# Patient Record
Sex: FEMALE | Race: WHITE | Employment: OTHER | URBAN - METROPOLITAN AREA
[De-identification: names, ages, dates, MRNs, and addresses within clinical notes are randomized per-mention and may not be internally consistent; named-entity substitution may affect disease eponyms.]

---

## 2017-10-03 ENCOUNTER — HOSPITAL ENCOUNTER (OUTPATIENT)
Dept: MAMMOGRAPHY | Age: 72
Discharge: HOME | End: 2017-10-03
Attending: INTERNAL MEDICINE | Admitting: INTERNAL MEDICINE

## 2017-10-03 DIAGNOSIS — Z12.31 VISIT FOR SCREENING MAMMOGRAM: ICD-10-CM

## 2018-10-08 ENCOUNTER — HOSPITAL ENCOUNTER (OUTPATIENT)
Dept: MAMMOGRAPHY | Age: 73
Discharge: HOME | End: 2018-10-08
Payer: MEDICARE

## 2018-10-08 DIAGNOSIS — Z12.31 VISIT FOR SCREENING MAMMOGRAM: ICD-10-CM

## 2018-10-08 PROCEDURE — 77063 BREAST TOMOSYNTHESIS BI: CPT

## 2019-10-14 ENCOUNTER — HOSPITAL ENCOUNTER (OUTPATIENT)
Dept: MAMMOGRAPHY | Age: 74
Discharge: HOME | End: 2019-10-14
Payer: MEDICARE

## 2019-10-14 DIAGNOSIS — Z12.31 VISIT FOR SCREENING MAMMOGRAM: ICD-10-CM

## 2019-10-14 PROCEDURE — 77063 BREAST TOMOSYNTHESIS BI: CPT

## 2019-10-24 LAB — ANTIBODY: NORMAL

## 2020-10-22 LAB
AVERAGE GLUCOSE: 117
HBA1C MFR BLD: 5.7 %

## 2020-10-23 ENCOUNTER — HOSPITAL ENCOUNTER (OUTPATIENT)
Dept: MAMMOGRAPHY | Age: 75
Discharge: HOME | End: 2020-10-23
Payer: MEDICARE

## 2020-10-23 PROCEDURE — 77063 BREAST TOMOSYNTHESIS BI: CPT

## 2021-01-11 ENCOUNTER — OFFICE VISIT (OUTPATIENT)
Dept: ORTHOPEDIC SURGERY | Age: 76
End: 2021-01-11
Payer: MEDICARE

## 2021-01-11 VITALS — BODY MASS INDEX: 24.11 KG/M2 | TEMPERATURE: 97.3 F | WEIGHT: 150 LBS | HEIGHT: 66 IN

## 2021-01-11 DIAGNOSIS — M25.511 RIGHT SHOULDER PAIN, UNSPECIFIED CHRONICITY: ICD-10-CM

## 2021-01-11 DIAGNOSIS — M67.911 TENDINOPATHY OF RIGHT ROTATOR CUFF: Primary | ICD-10-CM

## 2021-01-11 PROCEDURE — 99203 OFFICE O/P NEW LOW 30 MIN: CPT | Performed by: ORTHOPAEDIC SURGERY

## 2021-01-11 SDOH — HEALTH STABILITY: MENTAL HEALTH: HOW OFTEN DO YOU HAVE A DRINK CONTAINING ALCOHOL?: NEVER

## 2021-01-11 SDOH — HEALTH STABILITY: MENTAL HEALTH: HOW MANY STANDARD DRINKS CONTAINING ALCOHOL DO YOU HAVE ON A TYPICAL DAY?: NOT ASKED

## 2021-01-11 NOTE — PROGRESS NOTES
CHIEF COMPLAINT: Right shoulder pain    History:    More Bailey is a 77 y.o. right handed female self-referred for evaluation and treatment of Right shoulder pain. This is evaluated as a personal injury. The pain is described as aching. The pain began 1 year ago. There was not an injury. Pain is rated as a 4/10. Pain is located lateral. The symptoms are worse with reaching, lifting, work at or above shoulder height, laying on side. Symptoms improve with heat and Advil. No stiffness, no weakness reported. The patient has not had PT. The patient has not had an injection. The patient has tried NSAIDs. The patient has not tried ice. Patient's occupation is retired. Outside reports reviewed:  none. History reviewed. No pertinent past medical history. History reviewed. No pertinent surgical history. No current outpatient medications on file prior to visit. No current facility-administered medications on file prior to visit.         No Known Allergies    Social History     Socioeconomic History    Marital status: Single     Spouse name: Not on file    Number of children: Not on file    Years of education: Not on file    Highest education level: Not on file   Occupational History    Not on file   Social Needs    Financial resource strain: Not on file    Food insecurity     Worry: Not on file     Inability: Not on file    Transportation needs     Medical: Not on file     Non-medical: Not on file   Tobacco Use    Smoking status: Never Smoker    Smokeless tobacco: Never Used   Substance and Sexual Activity    Alcohol use: Never     Frequency: Never     Binge frequency: Never    Drug use: Not on file    Sexual activity: Not on file   Lifestyle    Physical activity     Days per week: Not on file     Minutes per session: Not on file    Stress: Not on file   Relationships    Social connections     Talks on phone: Not on file     Gets together: Not on file     Attends Faith service: Not on file     Active member of club or organization: Not on file     Attends meetings of clubs or organizations: Not on file     Relationship status: Not on file    Intimate partner violence     Fear of current or ex partner: Not on file     Emotionally abused: Not on file     Physically abused: Not on file     Forced sexual activity: Not on file   Other Topics Concern    Not on file   Social History Narrative    Not on file       History reviewed. No pertinent family history. Review of Systems:   I have reviewed the clinically relevant past medical history, medications, allergies, family history, social history, and 13 point Review of Systems from the patient's recent history form & documented any details relevant to today's presenting complaints in the history above. The patient's self-reported past medical history, medications, allergies, family history, social history, and Review of Systems form from 1/11/21 have been scanned into the chart under the \"Media\" tab. Physical Examination:      Vital signs:  Temp 97.3 °F (36.3 °C)   Ht 5' 6\" (1.676 m)   Wt 150 lb (68 kg)   BMI 24.21 kg/m²     General:   alert, appears stated age, cooperative and no distress   Right Shoulder   Active ROM:   forward flexion 170, external rotation 70, internal rotation L4. Bilateral shoulders   Passive ROM:  forward flexion 180    Joint Tenderness:   lateral acromial   Neer:   positive   Corona:   positive   Strength:   5/5 Supraspinatus, External rotation    Bilateral shoulders   Drop-arm test:   negative   Belly-press test:   negative   Bear-hug test:   negative   Speed's test:   negative   Bicipital groove tenderness:  negative   Jaimes's test:   not tested   Cross-body adduction test:   negative    AC joint tenderness:   negative     There are no skin lesions, cellulitis, or extreme edema in the upper extremities. Sensation is grossly intact to light touch bilaterally upper extremity.  The patient has warm and well-perfused Bilateral upper extremities with brisk capillary refill. Imaging   Right Shoulder X-Ray: 3 view x-rays of the shoulder including AP, scapular Y, and axillary    obtained and reviewed  AC Joint: narrowing moderate  Glenohumeral joint: no abnormalities noted  Elevation humeral head: absent        Assessment:      Right shoulder rotator cuff tendinopathy      Plan:      Natural history and expected course discussed. Questions answered. Ice prn. Offered injection. She deferred. NSAIDs prn. PT referral.     Follow up in 2 months.

## 2021-01-19 ENCOUNTER — HOSPITAL ENCOUNTER (OUTPATIENT)
Dept: PHYSICAL THERAPY | Age: 76
Setting detail: THERAPIES SERIES
Discharge: HOME | End: 2021-01-19
Payer: MEDICARE

## 2021-01-19 PROCEDURE — 97110 THERAPEUTIC EXERCISES: CPT

## 2021-01-19 PROCEDURE — 97161 PT EVAL LOW COMPLEX 20 MIN: CPT

## 2021-01-19 PROCEDURE — 97140 MANUAL THERAPY 1/> REGIONS: CPT

## 2021-01-19 NOTE — FLOWSHEET NOTE
[facility] [place].  [address]  Phone: [phone]   Fax:     [phone]        Physical Therapy Treatment Note/ Progress Report:       Date:  2021    Patient Name:  Temitope Talamantes   \"Nicole\"    :  1945  MRN: [id number]    Pertinent Medical History:     Medical/Treatment Diagnosis Information:  Diagnosis: Tendinopathy of right rotator cuff (M67.911  Treatment Diagnosis: Right shoulder dysfunction due to rotator cuff functional impingement/ tendinitis    Insurance/Certification information:  PT Insurance Information: Dicie Meckel PPO  Physician Information:  Referring Practitioner: Dr Sha Fernandez of care signed (Y/N): routed 21    Date of Patient follow up with Physician: 3/15/21     Progress Report: []  Yes  [x]  No     Date Range for reporting period:  Beginnin2021  Ending:      Progress report due (10 Rx/or 30 days whichever is less): 56    Recertification due (POC duration/ or 90 days whichever is less):      Visit # POC/Insurance Allowable Auth Needed   1 BOMN []Yes    [x]No     Functional Outcomes Measure:   Date Assessed:  Test:  Score:     Pain level:0-7 /10     History of Injury:     SUBJECTIVE:  See eval    OBJECTIVE:    Observation:    Test measurements:      RESTRICTIONS/PRECAUTIONS: none    Exercises/Interventions:   Therapeutic Ex (73936)  Min: Resistance/Reps Cues/Notes   T slide  Rows  Ext  Add  IR  ER     Door stretch                Mat Ex     SL'ing ranger with stretch to scap muscles      SL'ing ER     Chin tucks           Manual Intervention  ([id number] [facility])  Min:     IASTM     shld mobs     stretching               NMR re-education ()  Min:               Therapeutic Activity (06373)  Min:               Modalities:  Min     Pulsed US                      Other Therapeutic Activities:  Pt was educated on PT POC, Diagnosis, Prognosis, pathomechanics as well as frequency and duration of scheduling future physical therapy appointments. Time was also taken on this day to answer all patient questions and participation in PT. Reviewed appointment policy in detail with patient and patient verbalized understanding. Home Exercise Program:  HEP instruction: Access Code: [email or web address]   URL: [email or web address]   Date: 01/19/2021   Prepared by: Radha Mcgraw     Exercises   · Seated Scapular Retraction - 10 reps - 1-2 sets - 5 hold - 2x daily - 7x weekly   · Isometric Shoulder Abduction at Wall - 15 reps - 1-2 sets - 5 hold - 2x daily - 7x weekly   · Standing Isometric Shoulder Internal Rotation at Doorway - 15 reps - 1-2 sets - 5 hold - 2x daily - 7x weekly   · Standing Isometric Shoulder External Rotation with Doorway and Towel Roll - 15 reps - 1-2 sets - 5 hold - 2x daily - 7x weekly   The patient demonstrated good tolerance to and understanding of the HEP. Written instructions have been issued.       Therapeutic Exercise and NMR EXR  [] (40878) Provided verbal/tactile cueing for activities related to strengthening, flexibility, endurance, ROM  for improvements in scapular, scapulothoracic and UE control with self care, reaching, carrying, lifting, house/yardwork, driving/computer work.    [] (76796) Provided verbal/tactile cueing for activities related to improving balance, coordination, kinesthetic sense, posture, motor skill, proprioception  to assist with  scapular, scapulothoracic and UE control with self care, reaching, carrying, lifting, house/yardwork, driving/computer work. Therapeutic Activities:    [] (54377 or 25309) Provided verbal/tactile cueing for activities related to improving balance, coordination, kinesthetic sense, posture, motor skill, proprioception and motor activation to allow for proper function of scapular, scapulothoracic and UE control with self care, carrying, lifting, driving/computer work.      Home Exercise Program:    [x] (40638) Reviewed/Progressed HEP activities related to strengthening, flexibility, endurance, ROM of scapular, scapulothoracic and UE control with self care, reaching, carrying, lifting, house/yardwork, driving/computer work  [] (62854) Reviewed/Progressed HEP activities related to improving balance, coordination, kinesthetic sense, posture, motor skill, proprioception of scapular, scapulothoracic and UE control with self care, reaching, carrying, lifting, house/yardwork, driving/computer work      Manual Treatments:  PROM / STM / Oscillations-Mobs:  G-I, II, III, IV (PA's, Inf., Post.)  [] (20231) Provided manual therapy to mobilize soft tissue/joints of cervical/CT, scapular GHJ and UE for the purpose of modulating pain, promoting relaxation,  increasing ROM, reducing/eliminating soft tissue swelling/inflammation/restriction, improving soft tissue extensibility and allowing for proper ROM for normal function with self care, reaching, carrying, lifting, house/yardwork, driving/computer work    Charges:  Timed Code Treatment Minutes: 25   Total Treatment Minutes: 45       [x] EVAL (LOW) 34567 (typically 20 minutes face-to-face)  [] EVAL (MOD) 46989 (typically 30 minutes face-to-face)  [] EVAL (HIGH) 87615 (typically 45 minutes face-to-face)  [] RE-EVAL     [x] KF(45228) x     [] Dry needle 1 or 2 Muscles (19926)  [] NMR (38418) x     [] Dry needle 3+ Muscles (19535)  [x] Manual (06036) x     [] Ultrasound (78515) x  [] TA (66861) x     [] Mech Traction (94756)  [] ES(attended) (22101)     [] ES (un) (03526):   [] Vasopump (76347) [] Ionto (94120)   [] Other:      GOALS:  Patient stated goal:\"to be rid of stiffness and weakness\"  []? Progressing: []? Met: []? Not Met: []? Adjusted     Therapist goals for Patient:   Short Term Goals: To be achieved in: 2 weeks  1. Independent in HEP and progression per patient tolerance, in order to prevent re-injury. []? Progressing: []? Met: []?  Not Met: []? Adjusted  2. Patient will have a decrease in pain 0-4/10 to facilitate improvement in movement, function, and ADLs as indicated by Functional Deficits. []? Progressing: []? Met: []? Not Met: []? Adjusted     Long Term Goals: To be achieved in: 6 weeks  1. Disability index score of 16 or less for the Quick DASH to assist with reaching prior level of function. []? Progressing: []? Met: []? Not Met: []? Adjusted  2. Patient will demonstrate increased AROM to 10 each flexion and abduction  to allow for proper joint functioning as indicated by Functional Deficits. []? Progressing: []? Met: []? Not Met: []? Adjusted  3. Patient will return to   activities without increased symptoms or restriction. []? Progressing: []? Met: []? Not Met: []? Adjusted  4 . Patient will have a decrease in pain 0-4/10 to facilitate improvement in movement, function, and ADLs as indicated by Functional Deficits. []? Progressing: []? Met: []? Not Met: []? Adjusted    ASSESSMENT:  See eval    Treatment/Activity Tolerance:  [x] Patient tolerated treatment well [] Patient limited by fatique  [] Patient limited by pain  [] Patient limited by other medical complications  [] Other:     Overall Progression Towards Functional goals/ Treatment Progress Update:  [] Patient is progressing as expected towards functional goals listed. [] Progression is slowed due to complexities/Impairments listed. [] Progression has been slowed due to co-morbidities. [x] Plan just implemented, too soon to assess goals progression <30days   [] Goals require adjustment due to lack of progress  [] Patient is not progressing as expected and requires additional follow up with physician  [] Other    Prognosis for POC: [x] Good [] Fair  [] Poor    Patient requires continued skilled intervention: [x] Yes  [] No      PLAN: Postural ed.  And training, decrease forward head posture, improve shoulder postural alignment, decrease inflammation and improve RC strength   []

## 2021-01-19 NOTE — PROGRESS NOTES
[facility]. [address]  Phone: [phone]   Fax:     [phone]                                                       Physical Therapy Certification    Dear Referring Practitioner: Dr Yolanda Figueroa,    We had the pleasure of evaluating the following patient for physical therapy services at [facility]. A summary of our findings can be found in the initial assessment below. This includes our plan of care. If you have any questions or concerns regarding these findings, please do not hesitate to contact me at the office phone number checked above.   Thank you for the referral.       Physician Signature:_______________________________Date:__________________  By signing above (or electronic signature), therapists plan is approved by physician              Patient: Jose Enrique Kwan   : 1945   MRN: [id number]  Referring Physician: Referring Practitioner: Dr Yolanda Figueroa      Evaluation Date: 2021      Medical Diagnosis Information:  Diagnosis: Tendinopathy of right rotator cuff (M67.911   Treatment Diagnosis: Right shoulder dysfunction due to rotator cuff functional impingement/ tendinitis                                         Insurance information: PT Insurance Information: Diane Jett PPO    Precautions/ Contra-indications:  Latex Allergy:   [x]  NO      []YES  Preferred Language for Healthcare:   [x]English       []other:    C-SSRS Triggered by Intake questionnaire (Past 2 wk assessment ):   [x] No, Questionnaire did not trigger screening.   [] Yes, Patient intake triggered C-SSRS Screening      [] C-SSRS Screening completed  [] PCP notified via Epic     SUBJECTIVE: c/o right shoulder pain slowly worsening over the last year or so, she is right side dominant , has not had neck pain in 3 weeks     Relevant Medical History:   Functional Outcomes Measure: Quick dash = 32    Pain Scale: 0-7/10 averages 4/10  Easing factors: sitting/ reading  Provocative factors: dressing/ undressing, reaching up to get things from a shelf and lying on right side, typically better in the mornings and worse as the day goes on    Type: []Constant   [x]Intermittent  []Radiating []Localized []other:     Numbness/Tingling: none    Occupation/School:retired     Living Status/Prior Level of Function: Independent with ADLs and IADLs    OBJECTIVE:   Posture: forward head, increased thoracic kyphosis, protracted shouolders     Functional Mobility/Transfers: independent     Palpation:ttp Right  teres muscles, pectoralis and acj    Bandages/Dressings/Incisions: NA    Gait: (include devices/WB status): WNL     CERV ROM     Cervical Flexion wnl's    Cervical Extension Mod decrease    Cervical SB Mod decrease     Cervical rotation 0-35 L/R ea         ROM Left Right   Shoulder Flex 0-135 0-125   Shoulder Abd 0-125 0-115   Shoulder ER  0-85   Shoulder IR  0-55             Strength  Left Right   Shoulder Flex  4+/5   Shoulder Scap  4+   Shoulder ER  4+   Shoulder IR  5           Reflexes Normal Abnormal Comments   [x]ALL NORMAL            S1-2 Seated achilles [] []    S1-2 Prone knee bend [] []    L3-4 Patellar tendon [] []    C5-6 Biceps [] []    C6 Brachioradialis [] []    C7-8 Triceps [] []    Clonus [] []    Babinski [] []    Ansari's [] []      Reflexes/Sensation:    [x]Dermatomes/Myotomes intact    [x]Reflexes equal and normal bilaterally   []Other:    Joint mobility:r SCAP. THOR. AND GHJ    []Normal    [x]Hypo   []Hyper    Orthopedic Special Tests: R shld. Tests positive for Neer's, Hawkin's Benjy and O'Briens                        [x] Patient history, allergies, meds reviewed. Medical chart reviewed. See intake form. Review Of Systems (ROS):  [x]Performed Review of systems (Integumentary, CardioPulmonary, Neurological) by intake and observation.  Intake form has been scanned into medical record. Patient has been instructed to contact their primary care physician regarding ROS issues if not already being addressed at this time. Co-morbidities/Complexities (which will affect course of rehabilitation):   []None           Arthritic conditions   []Rheumatoid arthritis (M05.9)  [x]Osteoarthritis (M19.91)   Cardiovascular conditions   []Hypertension (I10)  []Hyperlipidemia (E78.5)  []Angina pectoris (I20)  []Atherosclerosis (I70)  []CVA Musculoskeletal conditions   []Disc pathology   []Congenital spine pathologies   []Prior surgical intervention  []Osteoporosis (M81.8)  []Osteopenia (M85.8)   Endocrine conditions   []Hypothyroid (E03.9)  []Hyperthyroid Gastrointestinal conditions   []Constipation (J58.45)   Metabolic conditions   []Morbid obesity (E66.01)  []Diabetes type 1(E10.65) or 2 (E11.65)   []Neuropathy (G60.9)     Pulmonary conditions   []Asthma (J45)  []Coughing   []COPD (J44.9)   Psychological Disorders  []Anxiety (F41.9)  []Depression (F32.9)   []Other:   []Other:          Barriers to/and or personal factors that will affect rehab potential:              [x]Age  []Sex   []Smoker              []Motivation/Lack of Motivation                        []Co-Morbidities              []Cognitive Function, education/learning barriers              []Environmental, home barriers              []profession/work barriers  []past PT/medical experience  []other:  Justification:     Falls Risk Assessment (30 days):   [x] Falls Risk assessed and no intervention required.   [] Falls Risk assessed and Patient requires intervention due to being higher risk   TUG score (>12s at risk):     [] Falls education provided, including         ASSESSMENT:    Functional Impairments:     [x]Noted spinal or UE joint hypomobility   []Noted spinal or UE joint hypermobility   [x]Decreased spinal/UE functional ROM   []Abnormal reflexes/sensation/myotomal/dermatomal deficits   []Decreased RC/scapular/core advanced manual therapy techniques: (Elevated CV risk profile, recent trauma, intolerance to end range positions, prior TIA, visual issues, UE neurological compromise )     Prognosis/Rehab Potential:      []Excellent   [x]Good    [x]Fair   []Poor    Tolerance of evaluation/treatment:    []Excellent   [x]Good    []Fair   []Poor    Physical Therapy Evaluation Complexity Justification  [x] A history of present problem with:  [] no personal factors and/or comorbidities that impact the plan of care;  [x]1-2 personal factors and/or comorbidities that impact the plan of care  []3 personal factors and/or comorbidities that impact the plan of care  [x] An examination of body systems using standardized tests and measures addressing any of the following: body structures and functions (impairments), activity limitations, and/or participation restrictions;:  [x] a total of 1-2 or more elements   [] a total of 3 or more elements   [] a total of 4 or more elements   [x] A clinical presentation with:  [x] stable and/or uncomplicated characteristics   [] evolving clinical presentation with changing characteristics  [] unstable and unpredictable characteristics;   [x] Clinical decision making of [] low, [] moderate, [] high complexity using standardized patient assessment instrument and/or measurable assessment of functional outcome. [x] EVAL (LOW) 00624 (typically 20 minutes face-to-face)  [] EVAL (MOD) 39769 (typically 30 minutes face-to-face)  [] EVAL (HIGH) 63310 (typically 45 minutes face-to-face)  [] RE-EVAL     PLAN:   Frequency/Duration:  2 days per week for 4-6Weeks:  Interventions:  [x]  Therapeutic exercise including: strength training, ROM, for scapula, core and Upper extremity, including postural re-education. [x]  NMR activation and proprioception for UE, periscapular and RC muscles and Core, including postural re-education.     [x]  Manual therapy as indicated for shoulder, scapula, spine and associated soft tissue including: Dry Needling/IASTM, STM, PROM, Gr I-IV mobilizations, manipulation. [x] Modalities as needed that may include: thermal agents, E-stim, Biofeedback, US, iontophoresis as indicated  [x] Patient education on joint protection, postural re-education, activity modification, progression of HEP. HEP instruction: Access Code: [email or web address]   URL: [email or web address]   Date: 01/19/2021   Prepared by: Magda Espinoza     Exercises   Seated Scapular Retraction - 10 reps - 1-2 sets - 5 hold - 2x daily - 7x weekly   Isometric Shoulder Abduction at Wall - 15 reps - 1-2 sets - 5 hold - 2x daily - 7x weekly   Standing Isometric Shoulder Internal Rotation at Doorway - 15 reps - 1-2 sets - 5 hold - 2x daily - 7x weekly   Standing Isometric Shoulder External Rotation with Doorway and Towel Roll - 15 reps - 1-2 sets - 5 hold - 2x daily - 7x weekly   The patient demonstrated good tolerance to and understanding of the HEP. Written instructions have been issued. GOALS:  Patient stated goal:\"to be rid of stiffness and weakness\"  [] Progressing: [] Met: [] Not Met: [] Adjusted    Therapist goals for Patient:   Short Term Goals: To be achieved in: 2 weeks  1. Independent in HEP and progression per patient tolerance, in order to prevent re-injury. [] Progressing: [] Met: [] Not Met: [] Adjusted  2. Patient will have a decrease in pain 0-4/10 to facilitate improvement in movement, function, and ADLs as indicated by Functional Deficits. [] Progressing: [] Met: [] Not Met: [] Adjusted    Long Term Goals: To be achieved in: 6 weeks  1. Disability index score of 16 or less for the Quick DASH to assist with reaching prior level of function. [] Progressing: [] Met: [] Not Met: [] Adjusted  2. Patient will demonstrate increased AROM to 10 each flexion and abduction  to allow for proper joint functioning as indicated by Functional Deficits. [] Progressing: [] Met: [] Not Met: [] Adjusted  3.  Patient will return to activities without increased symptoms or restriction. [] Progressing: [] Met: [] Not Met: [] Adjusted  4 . Patient will have a decrease in pain 0-4/10 to facilitate improvement in movement, function, and ADLs as indicated by Functional Deficits. [] Progressing: [] Met: [] Not Met: [] Adjusted    Electronically signed by:  Gianni Spangler PT      Note: If patient does not return for scheduled/recommended follow up visits, this note will serve as a discharge from care along with the most recent update on progress.

## 2021-01-22 ENCOUNTER — HOSPITAL ENCOUNTER (OUTPATIENT)
Dept: PHYSICAL THERAPY | Age: 76
Setting detail: THERAPIES SERIES
Discharge: HOME | End: 2021-01-22
Payer: MEDICARE

## 2021-01-22 PROCEDURE — 97035 APP MDLTY 1+ULTRASOUND EA 15: CPT

## 2021-01-22 PROCEDURE — 97110 THERAPEUTIC EXERCISES: CPT

## 2021-01-22 PROCEDURE — 97140 MANUAL THERAPY 1/> REGIONS: CPT

## 2021-01-22 NOTE — FLOWSHEET NOTE
[facility] [place].  [address]  Phone: [phone]   Fax:     [phone]        Physical Therapy Treatment Note/ Progress Report:       Date:  2021    Patient Name:  Guadalupe Lesch   \"Nicole\"    :  1945  MRN: [id number]    Pertinent Medical History:     Medical/Treatment Diagnosis Information:  Diagnosis: Tendinopathy of right rotator cuff (M67.911  Treatment Diagnosis: Right shoulder dysfunction due to rotator cuff functional impingement/ tendinitis    Insurance/Certification information:  PT Insurance Information: Eliu Altamirano PPO  Physician Information:  Referring Practitioner: Dr Jodie Dixon of care signed (Y/N): routed 21    Date of Patient follow up with Physician: 3/15/21     Progress Report: []  Yes  [x]  No     Date Range for reporting period:  Beginnin2021  Ending:      Progress report due (10 Rx/or 30 days whichever is less): 05    Recertification due (POC duration/ or 90 days whichever is less):      Visit # POC/Insurance Allowable Auth Needed   2 BOMN []Yes    [x]No     Functional Outcomes Measure:   Date Assessed:  Test:  Score:     Pain level:0-7 /10     History of Injury:     SUBJECTIVE:    21 Pt reports no pain as long as she is at rest.     OBJECTIVE:    Observation:    Test measurements:      RESTRICTIONS/PRECAUTIONS: none    Exercises/Interventions:   Therapeutic Ex (80906)  Min: Resistance/Reps Cues/Notes   T slide  Rows  Ext  Add  IR  ER   Green 3\" 30 x   Green 3\" 30 x     Door stretch                Mat Ex     SL'ing ranger with stretch to scap muscles      SL'ing ER     Chin tucks           Manual Intervention  ([phone])  Min:     IASTM R supraspinatus    shld mobs     stretching     DTM R supraspinatus and teres muscles           NMR re-education (41460)  Min:               Therapeutic Activity (07996)  Min: Modalities:  Min     Pulsed US 50% R GHJ x 8 min                     Other Therapeutic Activities:  Pt was educated on PT POC, Diagnosis, Prognosis, pathomechanics as well as frequency and duration of scheduling future physical therapy appointments. Time was also taken on this day to answer all patient questions and participation in PT. Reviewed appointment policy in detail with patient and patient verbalized understanding. Home Exercise Program:  HEP instruction: Access Code: [email or web address]   URL: [employer]/   Date: 01/19/2021   Prepared by: Radha Mcgraw     Exercises   · Seated Scapular Retraction - 10 reps - 1-2 sets - 5 hold - 2x daily - 7x weekly   · Isometric Shoulder Abduction at Wall - 15 reps - 1-2 sets - 5 hold - 2x daily - 7x weekly   · Standing Isometric Shoulder Internal Rotation at Doorway - 15 reps - 1-2 sets - 5 hold - 2x daily - 7x weekly   · Standing Isometric Shoulder External Rotation with Doorway and Towel Roll - 15 reps - 1-2 sets - 5 hold - 2x daily - 7x weekly   1/22/21 Reviewed HEP from 1/19 Corrected tech. With posture and position of exercise. Tech. Otherwise was good. The patient demonstrated good tolerance to and understanding of the HEP. Written instructions have been issued.       Therapeutic Exercise and NMR EXR  [] (73438) Provided verbal/tactile cueing for activities related to strengthening, flexibility, endurance, ROM  for improvements in scapular, scapulothoracic and UE control with self care, reaching, carrying, lifting, house/yardwork, driving/computer work.    [] (20811) Provided verbal/tactile cueing for activities related to improving balance, coordination, kinesthetic sense, posture, motor skill, proprioception  to assist with  scapular, scapulothoracic and UE control with self care, reaching, carrying, lifting, house/yardwork, driving/computer work.     Therapeutic Activities:    [] (65267 or ) Provided verbal/tactile cueing for activities related to improving balance, coordination, kinesthetic sense, posture, motor skill, proprioception and motor activation to allow for proper function of scapular, scapulothoracic and UE control with self care, carrying, lifting, driving/computer work. Home Exercise Program:    [x] (71337) Reviewed/Progressed HEP activities related to strengthening, flexibility, endurance, ROM of scapular, scapulothoracic and UE control with self care, reaching, carrying, lifting, house/yardwork, driving/computer work  [] (25409) Reviewed/Progressed HEP activities related to improving balance, coordination, kinesthetic sense, posture, motor skill, proprioception of scapular, scapulothoracic and UE control with self care, reaching, carrying, lifting, house/yardwork, driving/computer work      Manual Treatments:  PROM / STM / Oscillations-Mobs:  G-I, II, III, IV (PA's, Inf., Post.)  [] (66305) Provided manual therapy to mobilize soft tissue/joints of cervical/CT, scapular GHJ and UE for the purpose of modulating pain, promoting relaxation,  increasing ROM, reducing/eliminating soft tissue swelling/inflammation/restriction, improving soft tissue extensibility and allowing for proper ROM for normal function with self care, reaching, carrying, lifting, house/yardwork, driving/computer work    Charges:  Timed Code Treatment Minutes: 25   Total Treatment Minutes: 45       [x] EVAL (LOW) 96087 (typically 20 minutes face-to-face)  [] EVAL (MOD) 79571 (typically 30 minutes face-to-face)  [] EVAL (HIGH) 44530 (typically 45 minutes face-to-face)  [] RE-EVAL     [x] UH(58328) x     [] Dry needle 1 or 2 Muscles (27098)  [] NMR (92924) x     [] Dry needle 3+ Muscles (61828)  [x] Manual (88532) x     [] Ultrasound (95875) x  [] TA (10574) x     [] Mech Traction (34726)  [] ES(attended) (23839)     [] ES (un) (66726):   [] Vasopump (46534) [] Ionto (41508)   [] Other:      GOALS:  Patient stated goal:\"to be rid of stiffness and weakness\"  []? Progressing: []? Met: []? Not Met: []? Adjusted     Therapist goals for Patient:   Short Term Goals: To be achieved in: 2 weeks  1. Independent in HEP and progression per patient tolerance, in order to prevent re-injury. []? Progressing: []? Met: []? Not Met: []? Adjusted  2. Patient will have a decrease in pain 0-4/10 to facilitate improvement in movement, function, and ADLs as indicated by Functional Deficits. []? Progressing: []? Met: []? Not Met: []? Adjusted     Long Term Goals: To be achieved in: 6 weeks  1. Disability index score of 16 or less for the Quick DASH to assist with reaching prior level of function. []? Progressing: []? Met: []? Not Met: []? Adjusted  2. Patient will demonstrate increased AROM to 10 each flexion and abduction  to allow for proper joint functioning as indicated by Functional Deficits. []? Progressing: []? Met: []? Not Met: []? Adjusted  3. Patient will return to   activities without increased symptoms or restriction. []? Progressing: []? Met: []? Not Met: []? Adjusted  4 . Patient will have a decrease in pain 0-4/10 to facilitate improvement in movement, function, and ADLs as indicated by Functional Deficits. []? Progressing: []? Met: []? Not Met: []? Adjusted    ASSESSMENT:  See eval    Treatment/Activity Tolerance:  [x] Patient tolerated treatment well [] Patient limited by fatique  [] Patient limited by pain  [] Patient limited by other medical complications  [] Other:     Overall Progression Towards Functional goals/ Treatment Progress Update:  [] Patient is progressing as expected towards functional goals listed. [] Progression is slowed due to complexities/Impairments listed. [] Progression has been slowed due to co-morbidities.   [x] Plan just implemented, too soon to assess goals progression <30days   [] Goals require adjustment due to lack of progress  [] Patient is not progressing as expected and requires additional follow up with physician  [] Other    Prognosis for POC: [x] Good [] Fair  [] Poor    Patient requires continued skilled intervention: [x] Yes  [] No      PLAN:           ADD T slide IR/ ER consider open can   Postural ed. And training, decrease forward head posture, improve shoulder postural alignment, decrease inflammation and improve RC strength   [] Continue per plan of care [] Alter current plan (see comments)  [x] Plan of care initiated [] Hold pending MD visit [] Discharge    Electronically signed by: Jimy Garner PT     Note: If patient does not return for scheduled/recommended follow up visits, this note will serve as a discharge from care along with the most recent update on progress.

## 2021-01-25 ENCOUNTER — HOSPITAL ENCOUNTER (OUTPATIENT)
Dept: PHYSICAL THERAPY | Age: 76
Setting detail: THERAPIES SERIES
Discharge: HOME | End: 2021-01-25
Payer: MEDICARE

## 2021-01-25 PROCEDURE — 97140 MANUAL THERAPY 1/> REGIONS: CPT

## 2021-01-25 PROCEDURE — 97110 THERAPEUTIC EXERCISES: CPT

## 2021-01-25 NOTE — FLOWSHEET NOTE
[facility] [place]. [address]  Phone: [phone]   Fax:     [phone]        Physical Therapy Treatment Note/ Progress Report:       Date:  2021    Patient Name:  Jose Enrique Kwan   \"Nicole\"    :  1945  MRN: [id number]    Pertinent Medical History:     Medical/Treatment Diagnosis Information:  Diagnosis: Tendinopathy of right rotator cuff (M67.911  Treatment Diagnosis: Right shoulder dysfunction due to rotator cuff functional impingement/ tendinitis    Insurance/Certification information:  PT Insurance Information: Diane Jett PPO  Physician Information:  Referring Practitioner: Dr Pavel Mcgee of care signed (Y/N): routed 21    Date of Patient follow up with Physician: 3/15/21     Progress Report: []  Yes  [x]  No     Date Range for reporting period:  Beginnin2021  Ending:      Progress report due (10 Rx/or 30 days whichever is less):     Recertification due (POC duration/ or 90 days whichever is less):      Visit # POC/Insurance Allowable Auth Needed   3 BOMN []Yes    [x]No     Functional Outcomes Measure:   Date Assessed:  Test:  Score:     Pain level:1-2 /10     History of Injury:     SUBJECTIVE:    21 Pt reports no pain as long as she is at rest.   21 Pt reports little to no pain at rest but has pain with \"just lifting arm up\". OBJECTIVE:    Observation:    Test measurements:      RESTRICTIONS/PRECAUTIONS: none    Exercises/Interventions:   Therapeutic Ex (74622)  Min: Resistance/Reps Cues/Notes   T slide  Rows  Ext  Add  IR  ER   Green 3\" 30 x   Green 3\" 30 x     Green 20 x2  Red 20 x 2    Corner  stretch  3 way 12 breath cycles each  Suzette.  Well while preforming stretch , discomfort reported when lowering shoulders              Mat Ex     SL'ing ranger with stretch to scap muscles      SL'ing ER     Chin tucks           Manual Intervention  (19282)  Min: 25 min    IASTM R supraspinatus, infraspinatus , teres muscles, pectoralis muscles     shld mobs ADD    stretching ADD    DTM R  Supraspinatus, infraspinatus  and teres muscles          NMR re-education (87339)  Min:               Therapeutic Activity (08050)  Min:               Modalities:  Min     Pulsed US                      Other Therapeutic Activities:  Pt was educated on PT POC, Diagnosis, Prognosis, pathomechanics as well as frequency and duration of scheduling future physical therapy appointments. Time was also taken on this day to answer all patient questions and participation in PT. Reviewed appointment policy in detail with patient and patient verbalized understanding. Home Exercise Program:  HEP instruction: Access Code: [email or web address]   URL: [email or web address]   Date: 01/19/2021   Prepared by: Efren Cons     Exercises   · Seated Scapular Retraction - 10 reps - 1-2 sets - 5 hold - 2x daily - 7x weekly   · Isometric Shoulder Abduction at Wall - 15 reps - 1-2 sets - 5 hold - 2x daily - 7x weekly   · Standing Isometric Shoulder Internal Rotation at Doorway - 15 reps - 1-2 sets - 5 hold - 2x daily - 7x weekly   · Standing Isometric Shoulder External Rotation with Doorway and Towel Roll - 15 reps - 1-2 sets - 5 hold - 2x daily - 7x weekly   1/22/21 Reviewed HEP from 1/19 Corrected tech. With posture and position of exercise. Tech. Otherwise was good. The patient demonstrated good tolerance to and understanding of the HEP.  Written instructions have been issued.       Therapeutic Exercise and NMR EXR  [] (68797) Provided verbal/tactile cueing for activities related to strengthening, flexibility, endurance, ROM  for improvements in scapular, scapulothoracic and UE control with self care, reaching, carrying, lifting, house/yardwork, driving/computer work.    [] (74819) Provided verbal/tactile cueing for activities related to improving balance, coordination, kinesthetic sense, posture, motor skill, proprioception  to assist with  scapular, scapulothoracic and UE control with self care, reaching, carrying, lifting, house/yardwork, driving/computer work. Therapeutic Activities:    [] (74298 or 26966) Provided verbal/tactile cueing for activities related to improving balance, coordination, kinesthetic sense, posture, motor skill, proprioception and motor activation to allow for proper function of scapular, scapulothoracic and UE control with self care, carrying, lifting, driving/computer work.      Home Exercise Program:    [x] (27560) Reviewed/Progressed HEP activities related to strengthening, flexibility, endurance, ROM of scapular, scapulothoracic and UE control with self care, reaching, carrying, lifting, house/yardwork, driving/computer work  [] (92976) Reviewed/Progressed HEP activities related to improving balance, coordination, kinesthetic sense, posture, motor skill, proprioception of scapular, scapulothoracic and UE control with self care, reaching, carrying, lifting, house/yardwork, driving/computer work      Manual Treatments:  PROM / STM / Oscillations-Mobs:  G-I, II, III, IV (PA's, Inf., Post.)  [] (36685) Provided manual therapy to mobilize soft tissue/joints of cervical/CT, scapular GHJ and UE for the purpose of modulating pain, promoting relaxation,  increasing ROM, reducing/eliminating soft tissue swelling/inflammation/restriction, improving soft tissue extensibility and allowing for proper ROM for normal function with self care, reaching, carrying, lifting, house/yardwork, driving/computer work    Charges:  Timed Code Treatment Minutes: 45   Total Treatment Minutes: 45       [] EVAL (LOW) 95853 (typically 20 minutes face-to-face)  [] EVAL (MOD) 91300 (typically 30 minutes face-to-face)  [] EVAL (HIGH) 26918 (typically 45 minutes face-to-face)  [] RE-EVAL     [x] RV(71489) x     [] Dry needle 1 or 2 Muscles (01586)  [] NMR (25655) x     [] Dry needle 3+ Muscles (54008)  [x] Manual (36046) x  2   [] Ultrasound (97788) x  [] TA (91152) x     [] Mech Traction (92171)  [] ES(attended) (52411)     [] ES (un) (77246):   [] Vasopump (91256) [] Ionto (69553)   [] Other:      GOALS:  Patient stated goal:\"to be rid of stiffness and weakness\"  []? Progressing: []? Met: []? Not Met: []? Adjusted     Therapist goals for Patient:   Short Term Goals: To be achieved in: 2 weeks  1. Independent in HEP and progression per patient tolerance, in order to prevent re-injury. []? Progressing: []? Met: []? Not Met: []? Adjusted  2. Patient will have a decrease in pain 0-4/10 to facilitate improvement in movement, function, and ADLs as indicated by Functional Deficits. []? Progressing: []? Met: []? Not Met: []? Adjusted     Long Term Goals: To be achieved in: 6 weeks  1. Disability index score of 16 or less for the Quick DASH to assist with reaching prior level of function. []? Progressing: []? Met: []? Not Met: []? Adjusted  2. Patient will demonstrate increased AROM to 10 each flexion and abduction  to allow for proper joint functioning as indicated by Functional Deficits. []? Progressing: []? Met: []? Not Met: []? Adjusted  3. Patient will return to   activities without increased symptoms or restriction. []? Progressing: []? Met: []? Not Met: []? Adjusted  4 . Patient will have a decrease in pain 0-4/10 to facilitate improvement in movement, function, and ADLs as indicated by Functional Deficits. []? Progressing: []? Met: []? Not Met: []? Adjusted    ASSESSMENT:  See eval    Treatment/Activity Tolerance:  [x] Patient tolerated treatment well [] Patient limited by fatique  [] Patient limited by pain  [] Patient limited by other medical complications  [] Other:     Overall Progression Towards Functional goals/ Treatment Progress Update:  [] Patient is progressing as expected towards functional goals listed. [] Progression is slowed due to complexities/Impairments listed.   [] Progression has been slowed due to co-morbidities. [x] Plan just implemented, too soon to assess goals progression <30days   [] Goals require adjustment due to lack of progress  [] Patient is not progressing as expected and requires additional follow up with physician  [] Other    Prognosis for POC: [x] Good [] Fair  [] Poor    Patient requires continued skilled intervention: [x] Yes  [] No      PLAN:           ADD T slide IR/ ER consider open can   Postural ed. And training, decrease forward head posture, improve shoulder postural alignment, decrease inflammation and improve RC strength   [] Continue per plan of care [] Alter current plan (see comments)  [x] Plan of care initiated [] Hold pending MD visit [] Discharge    Electronically signed by: Newton Cockayne, PT     Note: If patient does not return for scheduled/recommended follow up visits, this note will serve as a discharge from care along with the most recent update on progress.

## 2021-01-28 ENCOUNTER — HOSPITAL ENCOUNTER (OUTPATIENT)
Dept: PHYSICAL THERAPY | Age: 76
Setting detail: THERAPIES SERIES
Discharge: HOME | End: 2021-01-28
Payer: MEDICARE

## 2021-01-28 PROCEDURE — 97140 MANUAL THERAPY 1/> REGIONS: CPT

## 2021-01-28 PROCEDURE — 97110 THERAPEUTIC EXERCISES: CPT

## 2021-01-28 PROCEDURE — 97035 APP MDLTY 1+ULTRASOUND EA 15: CPT

## 2021-01-28 NOTE — FLOWSHEET NOTE
[facility] [place]  Phone: [phone]   Fax:     [phone]        Physical Therapy Treatment Note/ Progress Report:       Date:  2021    Patient Name:  Crys Olivera   \"Nicole\"    :  1945  MRN: [id number]    Pertinent Medical History:     Medical/Treatment Diagnosis Information:  Diagnosis: Tendinopathy of right rotator cuff (M67.911  Treatment Diagnosis: Right shoulder dysfunction due to rotator cuff functional impingement/ tendinitis    Insurance/Certification information:  PT Insurance Information: Katie Baker PPO  Physician Information:  Referring Practitioner: Dr Larisa Sheridan of care signed (Y/N): routed 21    Date of Patient follow up with Physician: 3/15/21     Progress Report: []  Yes  [x]  No     Date Range for reporting period:  Beginnin2021  Ending:      Progress report due (10 Rx/or 30 days whichever is less): 2/10/32    Recertification due (POC duration/ or 90 days whichever is less):      Visit # POC/Insurance Allowable Auth Needed   4 BOMN []Yes    [x]No     Functional Outcomes Measure:   Date Assessed:  Test:  Score:     Pain level:1-2 /10     History of Injury:     SUBJECTIVE:    21 Pt reports no pain as long as she is at rest.   21 Pt reports little to no pain at rest but has pain with \"just lifting arm up\". 21: Pt reports it is little better than the other day. \" It is not so hard to raise it up. \"    OBJECTIVE:    Observation:    Test measurements:      RESTRICTIONS/PRECAUTIONS: none    Exercises/Interventions:   Therapeutic Ex (09974)  Min: Resistance/Reps Cues/Notes   T slide  Rows  Ext  Add  IR  ER   Green 3\" 30 x   Green 3\" 30 x     Green 20 x2  Red 20 x 2 21: Added for HEP and gave pt lime band for use at home   Corner  stretch  3 way 12 breath cycles each  Suzette.  Well while preforming stretch , discomfort reported when lowering shoulders              Mat Ex     SL'ing ranger with stretch to scap muscles      SL'ing ER     Trung berger           Manual Intervention  ([id number])  Min: 15 min    IASTM R supraspinatus, infraspinatus , teres muscles, pectoralis muscles     shld mobs ADD    stretching ADD    DTM R  Supraspinatus, infraspinatus  and teres muscles          NMR re-education (09641)  Min:               Therapeutic Activity (72230)  Min:               Modalities:  Min     Pulsed US 50% R GHJ x 9 min                     Other Therapeutic Activities:  Pt was educated on PT POC, Diagnosis, Prognosis, pathomechanics as well as frequency and duration of scheduling future physical therapy appointments. Time was also taken on this day to answer all patient questions and participation in PT. Reviewed appointment policy in detail with patient and patient verbalized understanding. Home Exercise Program:  HEP instruction: Access Code: [email or web address]   URL: [email or web address]   Date: 01/19/2021   Prepared by: Driss Coronaoty     Exercises   · Seated Scapular Retraction - 10 reps - 1-2 sets - 5 hold - 2x daily - 7x weekly   · Isometric Shoulder Abduction at Wall - 15 reps - 1-2 sets - 5 hold - 2x daily - 7x weekly   · Standing Isometric Shoulder Internal Rotation at Doorway - 15 reps - 1-2 sets - 5 hold - 2x daily - 7x weekly   · Standing Isometric Shoulder External Rotation with Doorway and Towel Roll - 15 reps - 1-2 sets - 5 hold - 2x daily - 7x weekly   1/22/21 Reviewed HEP from 1/19 Corrected tech. With posture and position of exercise. Tech. Otherwise was good. The patient demonstrated good tolerance to and understanding of the HEP.  Written instructions have been issued.       Therapeutic Exercise and NMR EXR  [] (95866) Provided verbal/tactile cueing for activities related to strengthening, flexibility, endurance, ROM  for improvements in scapular, scapulothoracic and UE control with self care, reaching, carrying, lifting, house/yardwork, driving/computer work.    [] (73236) Provided verbal/tactile cueing for activities related to improving balance, coordination, kinesthetic sense, posture, motor skill, proprioception  to assist with  scapular, scapulothoracic and UE control with self care, reaching, carrying, lifting, house/yardwork, driving/computer work. Therapeutic Activities:    [] (29472 or 54669) Provided verbal/tactile cueing for activities related to improving balance, coordination, kinesthetic sense, posture, motor skill, proprioception and motor activation to allow for proper function of scapular, scapulothoracic and UE control with self care, carrying, lifting, driving/computer work.      Home Exercise Program:    [x] (20294) Reviewed/Progressed HEP activities related to strengthening, flexibility, endurance, ROM of scapular, scapulothoracic and UE control with self care, reaching, carrying, lifting, house/yardwork, driving/computer work  [] (51189) Reviewed/Progressed HEP activities related to improving balance, coordination, kinesthetic sense, posture, motor skill, proprioception of scapular, scapulothoracic and UE control with self care, reaching, carrying, lifting, house/yardwork, driving/computer work      Manual Treatments:  PROM / STM / Oscillations-Mobs:  G-I, II, III, IV (PA's, Inf., Post.)  [] (30312) Provided manual therapy to mobilize soft tissue/joints of cervical/CT, scapular GHJ and UE for the purpose of modulating pain, promoting relaxation,  increasing ROM, reducing/eliminating soft tissue swelling/inflammation/restriction, improving soft tissue extensibility and allowing for proper ROM for normal function with self care, reaching, carrying, lifting, house/yardwork, driving/computer work    Charges:  Timed Code Treatment Minutes: 45   Total Treatment Minutes: 45       [] EVAL (LOW) 89942 (typically 20 minutes face-to-face)  [] EVAL (MOD) 44168 (typically 30 minutes face-to-face)  [] EVAL (HIGH) 87569 (typically 45 minutes face-to-face)  [] RE-EVAL     [x] LC(11561) x   1  [] Dry needle 1 or 2 Muscles (86189)  [] NMR (00939) x     [] Dry needle 3+ Muscles (72451)  [x] Manual (61774) x  1   [x] Ultrasound (26144) x 9 min  [] TA (46197) x     [] Mech Traction (43659)  [] ES(attended) (17661)     [] ES (un) (72941):   [] Vasopump (64042) [] Ionto (32630)   [] Other:      GOALS:  Patient stated goal:\"to be rid of stiffness and weakness\"  []? Progressing: []? Met: []? Not Met: []? Adjusted     Therapist goals for Patient:   Short Term Goals: To be achieved in: 2 weeks  1. Independent in HEP and progression per patient tolerance, in order to prevent re-injury. []? Progressing: []? Met: []? Not Met: []? Adjusted  2. Patient will have a decrease in pain 0-4/10 to facilitate improvement in movement, function, and ADLs as indicated by Functional Deficits. []? Progressing: []? Met: []? Not Met: []? Adjusted     Long Term Goals: To be achieved in: 6 weeks  1. Disability index score of 16 or less for the Quick DASH to assist with reaching prior level of function. []? Progressing: []? Met: []? Not Met: []? Adjusted  2. Patient will demonstrate increased AROM to 10 each flexion and abduction  to allow for proper joint functioning as indicated by Functional Deficits. []? Progressing: []? Met: []? Not Met: []? Adjusted  3. Patient will return to   activities without increased symptoms or restriction. []? Progressing: []? Met: []? Not Met: []? Adjusted  4 . Patient will have a decrease in pain 0-4/10 to facilitate improvement in movement, function, and ADLs as indicated by Functional Deficits. []? Progressing: []? Met: []? Not Met: []?  Adjusted    ASSESSMENT:  See eval    Treatment/Activity Tolerance:  [x] Patient tolerated treatment well [] Patient limited by fatique  [] Patient limited by pain  [] Patient limited by other medical complications  [] Other:     Overall Progression Towards Functional goals/ Treatment Progress Update:  [] Patient is progressing as expected towards functional goals listed. [] Progression is slowed due to complexities/Impairments listed. [] Progression has been slowed due to co-morbidities. [x] Plan just implemented, too soon to assess goals progression <30days   [] Goals require adjustment due to lack of progress  [] Patient is not progressing as expected and requires additional follow up with physician  [] Other    Prognosis for POC: [x] Good [] Fair  [] Poor    Patient requires continued skilled intervention: [x] Yes  [] No      PLAN:           ADD T slide IR/ ER consider open can   Postural ed. And training, decrease forward head posture, improve shoulder postural alignment, decrease inflammation and improve RC strength   [x] Continue per plan of care [] Alter current plan (see comments)  [] Plan of care initiated [] Hold pending MD visit [] Discharge    Electronically signed by: Tom Mcdonough PT     Note: If patient does not return for scheduled/recommended follow up visits, this note will serve as a discharge from care along with the most recent update on progress.

## 2021-02-01 ENCOUNTER — HOSPITAL ENCOUNTER (OUTPATIENT)
Dept: PHYSICAL THERAPY | Age: 76
Setting detail: THERAPIES SERIES
Discharge: HOME | End: 2021-02-01
Payer: MEDICARE

## 2021-02-01 PROCEDURE — 97110 THERAPEUTIC EXERCISES: CPT

## 2021-02-01 PROCEDURE — 97140 MANUAL THERAPY 1/> REGIONS: CPT

## 2021-02-01 PROCEDURE — 97035 APP MDLTY 1+ULTRASOUND EA 15: CPT

## 2021-02-01 NOTE — FLOWSHEET NOTE
[facility] [place]  Phone: [phone]   Fax:     [phone]        Physical Therapy Treatment Note/ Progress Report:       Date:  2021    Patient Name:  Linda Cisneros   \"Nicole\"    :  1945  MRN: [id number]    Pertinent Medical History:     Medical/Treatment Diagnosis Information:  Diagnosis: Tendinopathy of right rotator cuff (M67.911  Treatment Diagnosis: Right shoulder dysfunction due to rotator cuff functional impingement/ tendinitis    Insurance/Certification information:  PT Insurance Information: Radha MIRELES  Physician Information:  Referring Practitioner: Dr Hao Colunga of care signed (Y/N): routed 21    Date of Patient follow up with Physician: 3/15/21     Progress Report: []  Yes  [x]  No     Date Range for reporting period:  Beginnin2021  Ending:      Progress report due (10 Rx/or 30 days whichever is less): 04    Recertification due (POC duration/ or 90 days whichever is less):      Visit # POC/Insurance Allowable Auth Needed   5 BOMN []Yes    [x]No     Functional Outcomes Measure:   Date Assessed:  Test:  Score:     Pain level:1-2 /10     History of Injury:     SUBJECTIVE:    21 Pt reports no pain as long as she is at rest.   21 Pt reports little to no pain at rest but has pain with \"just lifting arm up\". 21: Pt reports it is little better than the other day. \" It is not so hard to raise it up. \"  21: Patient reports shoulder has been a little more sore since she used it a little more.     OBJECTIVE:    Observation:    Test measurements:      RESTRICTIONS/PRECAUTIONS: none    Exercises/Interventions:   Therapeutic Ex (13261)  Min: Resistance/Reps Cues/Notes   T slide  Rows  Ext  Add  IR  ER   Green 3\" 30 x   Green 3\" 30 x     Green 20 x2  Red 20 x 2 21: Added for HEP and gave pt lime band for use at home   Corner  stretch  3 way 12 breath cycles each  Suzette. Well             Mat Ex     SL'ing ranger with stretch to scap muscles      SL'ing ER     Trung berger           Manual Intervention  ([id number])  Min: 15 min    IASTM R supraspinatus, infraspinatus , teres muscles, pectoralis muscles     shld mobs ADD    stretching ADD    DTM R  Supraspinatus, infraspinatus  and teres muscles          NMR re-education (28549)  Min:               Therapeutic Activity (09071)  Min:               Modalities:  Min     Pulsed US 50% R GHJ x 9 min                     Other Therapeutic Activities:  Pt was educated on PT POC, Diagnosis, Prognosis, pathomechanics as well as frequency and duration of scheduling future physical therapy appointments. Time was also taken on this day to answer all patient questions and participation in PT. Reviewed appointment policy in detail with patient and patient verbalized understanding. Home Exercise Program:  HEP instruction: Access Code: [email or web address]   URL: [email or web address]   Date: 01/19/2021   Prepared by: Efren Cons     Exercises   · Seated Scapular Retraction - 10 reps - 1-2 sets - 5 hold - 2x daily - 7x weekly   · Isometric Shoulder Abduction at Wall - 15 reps - 1-2 sets - 5 hold - 2x daily - 7x weekly   · Standing Isometric Shoulder Internal Rotation at Doorway - 15 reps - 1-2 sets - 5 hold - 2x daily - 7x weekly   · Standing Isometric Shoulder External Rotation with Doorway and Towel Roll - 15 reps - 1-2 sets - 5 hold - 2x daily - 7x weekly   1/22/21 Reviewed HEP from 1/19 Corrected tech. With posture and position of exercise. Tech. Otherwise was good. The patient demonstrated good tolerance to and understanding of the HEP.  Written instructions have been issued.       Therapeutic Exercise and NMR EXR  [] (48469) Provided verbal/tactile cueing for activities related to strengthening, flexibility, endurance, ROM  for improvements in scapular, scapulothoracic and UE control with Towards Functional goals/ Treatment Progress Update:  [] Patient is progressing as expected towards functional goals listed. [] Progression is slowed due to complexities/Impairments listed. [] Progression has been slowed due to co-morbidities. [x] Plan just implemented, too soon to assess goals progression <30days   [] Goals require adjustment due to lack of progress  [] Patient is not progressing as expected and requires additional follow up with physician  [] Other    Prognosis for POC: [x] Good [] Fair  [] Poor    Patient requires continued skilled intervention: [x] Yes  [] No      PLAN:           ADD T slide IR/ ER consider open can   Postural ed. And training, decrease forward head posture, improve shoulder postural alignment, decrease inflammation and improve RC strength   [x] Continue per plan of care [] Alter current plan (see comments)  [] Plan of care initiated [] Hold pending MD visit [] Discharge    Electronically signed by: Janis Shaw PTA     Note: If patient does not return for scheduled/recommended follow up visits, this note will serve as a discharge from care along with the most recent update on progress.

## 2021-02-04 ENCOUNTER — HOSPITAL ENCOUNTER (OUTPATIENT)
Dept: PHYSICAL THERAPY | Age: 76
Setting detail: THERAPIES SERIES
Discharge: HOME | End: 2021-02-04
Payer: MEDICARE

## 2021-02-04 PROCEDURE — 97140 MANUAL THERAPY 1/> REGIONS: CPT

## 2021-02-04 PROCEDURE — 97035 APP MDLTY 1+ULTRASOUND EA 15: CPT

## 2021-02-04 PROCEDURE — 97110 THERAPEUTIC EXERCISES: CPT

## 2021-02-04 NOTE — FLOWSHEET NOTE
20 x2  Red 20 x 2 1/28/21: Added for HEP and gave pt lime band for use at home   Corner  stretch  3 way 12 breath cycles each  Suzette. Well             Mat Ex     SL'ing ranger with stretch to scap muscles      SL'ing ER     Chin tucks           Manual Intervention  ([id number])  Min: 25 min    IASTM R supraspinatus, infraspinatus , teres muscles, pectoralis muscles     shld mobs Gh inf post mobs gr 2     stretching PROM    DTM R  Supraspinatus, infraspinatus  and teres muscles          NMR re-education (94347)  Min:               Therapeutic Activity (77805)  Min:               Modalities:  Min     Pulsed US 50% R GHJ x 9 min                     Other Therapeutic Activities:  Pt was educated on PT POC, Diagnosis, Prognosis, pathomechanics as well as frequency and duration of scheduling future physical therapy appointments. Time was also taken on this day to answer all patient questions and participation in PT. Reviewed appointment policy in detail with patient and patient verbalized understanding. Home Exercise Program:  HEP instruction: Access Code: [email or web address]   URL: [employer]   Date: 01/19/2021   Prepared by: Ralph John     Exercises   · Seated Scapular Retraction - 10 reps - 1-2 sets - 5 hold - 2x daily - 7x weekly   · Isometric Shoulder Abduction at Wall - 15 reps - 1-2 sets - 5 hold - 2x daily - 7x weekly   · Standing Isometric Shoulder Internal Rotation at Doorway - 15 reps - 1-2 sets - 5 hold - 2x daily - 7x weekly   · Standing Isometric Shoulder External Rotation with Doorway and Towel Roll - 15 reps - 1-2 sets - 5 hold - 2x daily - 7x weekly   1/22/21 Reviewed HEP from 1/19 Corrected tech. With posture and position of exercise. Tech. Otherwise was good. The patient demonstrated good tolerance to and understanding of the HEP.  Written instructions have been issued.       Therapeutic Exercise and NMR EXR  [] (48000) Provided verbal/tactile cueing for activities related to strengthening, flexibility, endurance, ROM  for improvements in scapular, scapulothoracic and UE control with self care, reaching, carrying, lifting, house/yardwork, driving/computer work.    [] (29163) Provided verbal/tactile cueing for activities related to improving balance, coordination, kinesthetic sense, posture, motor skill, proprioception  to assist with  scapular, scapulothoracic and UE control with self care, reaching, carrying, lifting, house/yardwork, driving/computer work. Therapeutic Activities:    [] (84925 or 56537) Provided verbal/tactile cueing for activities related to improving balance, coordination, kinesthetic sense, posture, motor skill, proprioception and motor activation to allow for proper function of scapular, scapulothoracic and UE control with self care, carrying, lifting, driving/computer work.      Home Exercise Program:    [x] (12395) Reviewed/Progressed HEP activities related to strengthening, flexibility, endurance, ROM of scapular, scapulothoracic and UE control with self care, reaching, carrying, lifting, house/yardwork, driving/computer work  [] (36198) Reviewed/Progressed HEP activities related to improving balance, coordination, kinesthetic sense, posture, motor skill, proprioception of scapular, scapulothoracic and UE control with self care, reaching, carrying, lifting, house/yardwork, driving/computer work      Manual Treatments:  PROM / STM / Oscillations-Mobs:  G-I, II, III, IV (PA's, Inf., Post.)  [] (71029) Provided manual therapy to mobilize soft tissue/joints of cervical/CT, scapular GHJ and UE for the purpose of modulating pain, promoting relaxation,  increasing ROM, reducing/eliminating soft tissue swelling/inflammation/restriction, improving soft tissue extensibility and allowing for proper ROM for normal function with self care, reaching, carrying, lifting, house/yardwork, driving/computer work    Charges:  Timed Code Treatment Minutes: 45   Total Treatment Minutes: 55 02/04/21 additional time available for rx. [] EVAL (LOW) 27579 (typically 20 minutes face-to-face)  [] EVAL (MOD) 52461 (typically 30 minutes face-to-face)  [] EVAL (HIGH) 74530 (typically 45 minutes face-to-face)  [] RE-EVAL     [x] WE(64581) x   1  [] Dry needle 1 or 2 Muscles (33968)  [] NMR (99128) x     [] Dry needle 3+ Muscles (60908)  [x] Manual (75563) x  2   [x] Ultrasound (51582) x 9 min  [] TA (25501) x     [] Mech Traction (93267)  [] ES(attended) (57036)     [] ES (un) (02673):   [] Vasopump (68537) [] Ionto (73252)   [] Other:      GOALS:  Patient stated goal:\"to be rid of stiffness and weakness\"  []? Progressing: []? Met: []? Not Met: []? Adjusted     Therapist goals for Patient:   Short Term Goals: To be achieved in: 2 weeks  1. Independent in HEP and progression per patient tolerance, in order to prevent re-injury. []? Progressing: []? Met: []? Not Met: []? Adjusted  2. Patient will have a decrease in pain 0-4/10 to facilitate improvement in movement, function, and ADLs as indicated by Functional Deficits. []? Progressing: []? Met: []? Not Met: []? Adjusted     Long Term Goals: To be achieved in: 6 weeks  1. Disability index score of 16 or less for the Quick DASH to assist with reaching prior level of function. []? Progressing: []? Met: []? Not Met: []? Adjusted  2. Patient will demonstrate increased AROM to 10 each flexion and abduction  to allow for proper joint functioning as indicated by Functional Deficits. []? Progressing: []? Met: []? Not Met: []? Adjusted  3. Patient will return to   activities without increased symptoms or restriction. []? Progressing: []? Met: []? Not Met: []? Adjusted  4 . Patient will have a decrease in pain 0-4/10 to facilitate improvement in movement, function, and ADLs as indicated by Functional Deficits. []? Progressing: []? Met: []? Not Met: []?  Adjusted    ASSESSMENT:  See eval    Treatment/Activity Tolerance:  [x] Patient tolerated treatment well [] Patient limited by fatique  [] Patient limited by pain  [] Patient limited by other medical complications  [] Other:     Overall Progression Towards Functional goals/ Treatment Progress Update:  [] Patient is progressing as expected towards functional goals listed. [] Progression is slowed due to complexities/Impairments listed. [] Progression has been slowed due to co-morbidities. [x] Plan just implemented, too soon to assess goals progression <30days   [] Goals require adjustment due to lack of progress  [] Patient is not progressing as expected and requires additional follow up with physician  [] Other    Prognosis for POC: [x] Good [] Fair  [] Poor    Patient requires continued skilled intervention: [x] Yes  [] No      PLAN:           ADD T slide IR/ ER consider open can   Postural ed. And training, decrease forward head posture, improve shoulder postural alignment, decrease inflammation and improve RC strength   [x] Continue per plan of care [] Alter current plan (see comments)  [] Plan of care initiated [] Hold pending MD visit [] Discharge    Electronically signed by: Daisha Sorenson PTA     Note: If patient does not return for scheduled/recommended follow up visits, this note will serve as a discharge from care along with the most recent update on progress.

## 2021-02-08 ENCOUNTER — HOSPITAL ENCOUNTER (OUTPATIENT)
Dept: PHYSICAL THERAPY | Age: 76
Setting detail: THERAPIES SERIES
Discharge: HOME | End: 2021-02-08
Payer: MEDICARE

## 2021-02-08 PROCEDURE — 97110 THERAPEUTIC EXERCISES: CPT

## 2021-02-08 PROCEDURE — 97140 MANUAL THERAPY 1/> REGIONS: CPT

## 2021-02-08 NOTE — FLOWSHEET NOTE
[facility] [place]  Phone: [phone]   Fax:     [phone]        Physical Therapy Treatment Note/ Progress Report:       Date:  2021    Patient Name:  Ilia Vivar   \"Nicole\"    :  1945  MRN: [id number]    Pertinent Medical History:     Medical/Treatment Diagnosis Information:  Diagnosis: Tendinopathy of right rotator cuff (M67.911  Treatment Diagnosis: Right shoulder dysfunction due to rotator cuff functional impingement/ tendinitis    Insurance/Certification information:  PT Insurance Information: Noemy Pulido PPLYDIA  Physician Information:  Referring Practitioner: Dr Bob Dow of care signed (Y/N): routed 21    Date of Patient follow up with Physician: 3/15/21     Progress Report: []  Yes  [x]  No     Date Range for reporting period:  Beginnin2021  Ending:      Progress report due (10 Rx/or 30 days whichever is less):     Recertification due (POC duration/ or 90 days whichever is less):      Visit # POC/Insurance Allowable Auth Needed   7 BOMN []Yes    [x]No     Functional Outcomes Measure:   Date Assessed:  Test:  Score:     Pain level:0-1/10     History of Injury:     SUBJECTIVE:    21 Pt reports no pain as long as she is at rest.   21 Pt reports little to no pain at rest but has pain with \"just lifting arm up\". 21: Pt reports it is little better than the other day. \" It is not so hard to raise it up. \"  21: Patient reports shoulder has been a little more sore since she used it a little more. 21: Patient reports pain intensity decreasing,just still stiff. 21 Pt reports greater ease donning and doffing her coat.      OBJECTIVE:    Observation:    Test measurements:    ROM:  Date      Shldr flexion    Shldr abd  Shldr IR         Shldr ER   A P A P A P A P   Eval Strength:  Date Shoulder flexion Shoulder abduction Shoulder IR Shoulder  ER Bicep   Eval                                    RESTRICTIONS/PRECAUTIONS: none    Exercises/Interventions:   Therapeutic Ex (60372)  Min: Resistance/Reps Cues/Notes   T slide  Rows  Ext  Add  IR  ER   Blue 3\" 30 x   Blue 3\" 30 x     Blue 20 x2  Yellow  20 x 2 1/28/21: Added for HEP and gave pt lime band for use at home   Corner  stretch  3 way 12 breath cycles each  Suzette. Well             Mat Ex     SL'ing ranger with stretch to scap muscles      Prone  Flies 1# 30 x    SL'ing ER 1# 10 x 3    Chin tucks           Manual Intervention  (09056)  Min: 20 min    IASTM R supraspinatus, infraspinatus , teres muscles, pectoralis muscles     shld mobs Gh inf post mobs gr 2     stretching PROM    DTM R  Supraspinatus, infraspinatus  and teres muscles          NMR re-education (96628)  Min:               Therapeutic Activity (43352)  Min:               Modalities:  Min     Pulsed US                      Other Therapeutic Activities:  Pt was educated on PT POC, Diagnosis, Prognosis, pathomechanics as well as frequency and duration of scheduling future physical therapy appointments. Time was also taken on this day to answer all patient questions and participation in PT. Reviewed appointment policy in detail with patient and patient verbalized understanding. Home Exercise Program:  HEP instruction: Access Code: [email or web address]   URL: [email or web address]   Date: 01/19/2021   Prepared by: Maricel Alcazar     Exercises   · Seated Scapular Retraction - 10 reps - 1-2 sets - 5 hold - 2x daily - 7x weekly   · Isometric Shoulder Abduction at Wall - 15 reps - 1-2 sets - 5 hold - 2x daily - 7x weekly   · Standing Isometric Shoulder Internal Rotation at Doorway - 15 reps - 1-2 sets - 5 hold - 2x daily - 7x weekly   · Standing Isometric Shoulder External Rotation with Doorway and Towel Roll - 15 reps - 1-2 sets - 5 hold - 2x daily - 7x weekly   1/22/21 Reviewed HEP from 1/19 Corrected tech. With posture and position of exercise. Tech. Otherwise was good. The patient demonstrated good tolerance to and understanding of the HEP. Written instructions have been issued.       Therapeutic Exercise and NMR EXR  [] (93263) Provided verbal/tactile cueing for activities related to strengthening, flexibility, endurance, ROM  for improvements in scapular, scapulothoracic and UE control with self care, reaching, carrying, lifting, house/yardwork, driving/computer work.    [] (05514) Provided verbal/tactile cueing for activities related to improving balance, coordination, kinesthetic sense, posture, motor skill, proprioception  to assist with  scapular, scapulothoracic and UE control with self care, reaching, carrying, lifting, house/yardwork, driving/computer work. Therapeutic Activities:    [] (96677 or 58679) Provided verbal/tactile cueing for activities related to improving balance, coordination, kinesthetic sense, posture, motor skill, proprioception and motor activation to allow for proper function of scapular, scapulothoracic and UE control with self care, carrying, lifting, driving/computer work.      Home Exercise Program:    [x] (08873) Reviewed/Progressed HEP activities related to strengthening, flexibility, endurance, ROM of scapular, scapulothoracic and UE control with self care, reaching, carrying, lifting, house/yardwork, driving/computer work  [] (50184) Reviewed/Progressed HEP activities related to improving balance, coordination, kinesthetic sense, posture, motor skill, proprioception of scapular, scapulothoracic and UE control with self care, reaching, carrying, lifting, house/yardwork, driving/computer work      Manual Treatments:  PROM / STM / Oscillations-Mobs:  G-I, II, III, IV (PA's, Inf., Post.)  [] (16416) Provided manual therapy to mobilize soft tissue/joints of cervical/CT, scapular GHJ and UE for the purpose of modulating pain, promoting Progressing: []? Met: []? Not Met: []? Adjusted  4 . Patient will have a decrease in pain 0-4/10 to facilitate improvement in movement, function, and ADLs as indicated by Functional Deficits. []? Progressing: []? Met: []? Not Met: []? Adjusted    ASSESSMENT:  See eval    Treatment/Activity Tolerance:  [x] Patient tolerated treatment well [] Patient limited by fatique  [] Patient limited by pain  [] Patient limited by other medical complications  [] Other:     Overall Progression Towards Functional goals/ Treatment Progress Update:  [] Patient is progressing as expected towards functional goals listed. [] Progression is slowed due to complexities/Impairments listed. [] Progression has been slowed due to co-morbidities. [x] Plan just implemented, too soon to assess goals progression <30days   [] Goals require adjustment due to lack of progress  [] Patient is not progressing as expected and requires additional follow up with physician  [] Other    Prognosis for POC: [x] Good [] Fair  [] Poor    Patient requires continued skilled intervention: [x] Yes  [] No      PLAN:           Reassess/ complete HEP consider DC  last Rx 2/11/21  Postural ed. And training, decrease forward head posture, improve shoulder postural alignment, decrease inflammation and improve RC strength   [x] Continue per plan of care [] Alter current plan (see comments)  [] Plan of care initiated [] Hold pending MD visit [] Discharge    Electronically signed by: Severiano Maywood, PT     Note: If patient does not return for scheduled/recommended follow up visits, this note will serve as a discharge from care along with the most recent update on progress.

## 2021-02-11 ENCOUNTER — HOSPITAL ENCOUNTER (OUTPATIENT)
Dept: PHYSICAL THERAPY | Age: 76
Setting detail: THERAPIES SERIES
Discharge: HOME | End: 2021-02-11
Payer: MEDICARE

## 2021-02-11 NOTE — FLOWSHEET NOTE
Physical Therapy  Cancellation/No-show Note  Patient Name:  Darren Martinez  :  1945   Date:  2021  Cancelled visits to date: 0  No-shows to date: 1    For today's appointment patient:  []  Cancelled  []  Rescheduled appointment  [x]  No-show     Reason given by patient:  []  Patient ill  []  Conflicting appointment  []  No transportation    []  Conflict with work  []  No reason given  [x]  Other:     Comments: 21 Phoned patient, advised her missed session and discussed making up visit as she advised she thought she was to be seen tomorrow.       Electronically signed by:  Marium Sampson PT

## 2021-02-17 ENCOUNTER — HOSPITAL ENCOUNTER (OUTPATIENT)
Dept: PHYSICAL THERAPY | Age: 76
Setting detail: THERAPIES SERIES
Discharge: HOME | End: 2021-02-17
Payer: MEDICARE

## 2021-02-17 PROCEDURE — 97140 MANUAL THERAPY 1/> REGIONS: CPT

## 2021-02-17 PROCEDURE — 97110 THERAPEUTIC EXERCISES: CPT

## 2021-02-17 NOTE — FLOWSHEET NOTE
[facility] [place]  Phone: [phone]   Fax:     [phone]        Physical Therapy Treatment Note/ Progress Report:       Date:  2021    Patient Name:  Darren Martinez   \"Nicole\"    :  1945  MRN: [id number]    Pertinent Medical History:     Medical/Treatment Diagnosis Information:  Diagnosis: Tendinopathy of right rotator cuff (M67.911  Treatment Diagnosis: Right shoulder dysfunction due to rotator cuff functional impingement/ tendinitis    Insurance/Certification information:  PT Insurance Information: [employer]avides PPO  Physician Information:  Referring Practitioner: Dr Maria Victoria Muhammad of care signed (Y/N): routed 21    Date of Patient follow up with Physician: 3/15/21     Progress Report: []  Yes  [x]  No     Date Range for reporting period:  Beginnin2021  Ending:      Progress report due (10 Rx/or 30 days whichever is less):     Recertification due (POC duration/ or 90 days whichever is less):      Visit # POC/Insurance Allowable Auth Needed   8 BOMN []Yes    [x]No     Functional Outcomes Measure:   Date Assessed:  Test:  Score:     Pain level:0-1/10     History of Injury:     SUBJECTIVE:    21 Pt reports no pain as long as she is at rest.   21 Pt reports little to no pain at rest but has pain with \"just lifting arm up\". 21: Pt reports it is little better than the other day. \" It is not so hard to raise it up. \"  21: Patient reports shoulder has been a little more sore since she used it a little more. 21: Patient reports pain intensity decreasing,just still stiff. 21 Pt reports greater ease donning and doffing her coat.  21: Patient reports shoulder just feels stiff,overall is improving. States reaching behind back still has some difficulty.      OBJECTIVE:    Observation:    Test measurements: ROM:  Date      Shldr flexion    Shldr abd  Shldr IR         [employer] ER   A P A P A P A P   Eval                                              Strength:  Date Shoulder flexion Shoulder abduction Shoulder IR Shoulder  ER Bicep   Eval                                    RESTRICTIONS/PRECAUTIONS: none    Exercises/Interventions:   Therapeutic Ex (41897)  Min: Resistance/Reps Cues/Notes   T slide  Rows  Ext  Add  IR  ER   Blue 3\" 30 x   Blue 3\" 30 x     Blue 20 x2  Yellow  20 x 2 1/28/21: Added for HEP and gave pt lime band for use at home   Corner  stretch  3 way 12 breath cycles each  Suzette. Well             Mat Ex     SL'ing ranger with stretch to scap muscles      Prone  Flies 1# 30 x    SL'ing ER 1# 10 x 3    Chin tucks           Manual Intervention  (38151)  Min: 20 min    IASTM R supraspinatus, infraspinatus , teres muscles, pectoralis muscles     shld mobs Gh inf post mobs gr 2     stretching PROM    DTM R  Supraspinatus, infraspinatus  and teres muscles          NMR re-education (97173)  Min:               Therapeutic Activity (28655)  Min:               Modalities:  Min     Pulsed US                      Other Therapeutic Activities:  Pt was educated on PT POC, Diagnosis, Prognosis, pathomechanics as well as frequency and duration of scheduling future physical therapy appointments. Time was also taken on this day to answer all patient questions and participation in PT. Reviewed appointment policy in detail with patient and patient verbalized understanding. Home Exercise Program:  HEP instruction: Access Code: [email or web address]   URL: [email or web address]   Date: 01/19/2021   Prepared by: Marybeth Covington     Exercises   · Seated Scapular Retraction - 10 reps - 1-2 sets - 5 hold - 2x daily - 7x weekly   · Isometric Shoulder Abduction at Wall - 15 reps - 1-2 sets - 5 hold - 2x daily - 7x weekly   · Standing Isometric Shoulder Internal Rotation at Doorway - 15 reps - 1-2 sets - 5 hold - 2x daily - 7x weekly · Standing Isometric Shoulder External Rotation with Doorway and Towel Roll - 15 reps - 1-2 sets - 5 hold - 2x daily - 7x weekly   1/22/21 Reviewed HEP from 1/19 Corrected tech. With posture and position of exercise. Tech. Otherwise was good. The patient demonstrated good tolerance to and understanding of the HEP. Written instructions have been issued.       Therapeutic Exercise and NMR EXR  [] (08076) Provided verbal/tactile cueing for activities related to strengthening, flexibility, endurance, ROM  for improvements in scapular, scapulothoracic and UE control with self care, reaching, carrying, lifting, house/yardwork, driving/computer work.    [] (72313) Provided verbal/tactile cueing for activities related to improving balance, coordination, kinesthetic sense, posture, motor skill, proprioception  to assist with  scapular, scapulothoracic and UE control with self care, reaching, carrying, lifting, house/yardwork, driving/computer work. Therapeutic Activities:    [] (43503 or 14079) Provided verbal/tactile cueing for activities related to improving balance, coordination, kinesthetic sense, posture, motor skill, proprioception and motor activation to allow for proper function of scapular, scapulothoracic and UE control with self care, carrying, lifting, driving/computer work.      Home Exercise Program:    [x] (16730) Reviewed/Progressed HEP activities related to strengthening, flexibility, endurance, ROM of scapular, scapulothoracic and UE control with self care, reaching, carrying, lifting, house/yardwork, driving/computer work  [] (07508) Reviewed/Progressed HEP activities related to improving balance, coordination, kinesthetic sense, posture, motor skill, proprioception of scapular, scapulothoracic and UE control with self care, reaching, carrying, lifting, house/yardwork, driving/computer work      Manual Treatments:  PROM / STM / Oscillations-Mobs:  G-I, II, III, IV (PA's, Inf., Post.)  [] (64122) Provided manual therapy to mobilize soft tissue/joints of cervical/CT, scapular GHJ and UE for the purpose of modulating pain, promoting relaxation,  increasing ROM, reducing/eliminating soft tissue swelling/inflammation/restriction, improving soft tissue extensibility and allowing for proper ROM for normal function with self care, reaching, carrying, lifting, house/yardwork, driving/computer work    Charges:  Timed Code Treatment Minutes: 50   Total Treatment Minutes: 50   02/04/21 additional time available for rx. [] EVAL (LOW) 70880 (typically 20 minutes face-to-face)  [] EVAL (MOD) 21802 (typically 30 minutes face-to-face)  [] EVAL (HIGH) 69645 (typically 45 minutes face-to-face)  [] RE-EVAL     [x] HJ(92525) x   1  [] Dry needle 1 or 2 Muscles (59611)  [] NMR (97451) x     [] Dry needle 3+ Muscles (28434)  [x] Manual (06178) x  2   [] Ultrasound (29650) x   [] TA (39335) x     [] Mech Traction (61976)  [] ES(attended) (68216)     [] ES (un) (66948):   [] Vasopump (81715) [] Ionto (06559)   [] Other:      GOALS:  Patient stated goal:\"to be rid of stiffness and weakness\"  []? Progressing: []? Met: []? Not Met: []? Adjusted     Therapist goals for Patient:   Short Term Goals: To be achieved in: 2 weeks  1. Independent in HEP and progression per patient tolerance, in order to prevent re-injury. []? Progressing: []? Met: []? Not Met: []? Adjusted  2. Patient will have a decrease in pain 0-4/10 to facilitate improvement in movement, function, and ADLs as indicated by Functional Deficits. []? Progressing: []? Met: []? Not Met: []? Adjusted     Long Term Goals: To be achieved in: 6 weeks  1. Disability index score of 16 or less for the Quick DASH to assist with reaching prior level of function. []? Progressing: []? Met: []? Not Met: []? Adjusted  2. Patient will demonstrate increased AROM to 10 each flexion and abduction  to allow for proper joint functioning as indicated by Functional Deficits.    []? Progressing: []? Met: []? Not Met: []? Adjusted  3. Patient will return to   activities without increased symptoms or restriction. []? Progressing: []? Met: []? Not Met: []? Adjusted  4 . Patient will have a decrease in pain 0-4/10 to facilitate improvement in movement, function, and ADLs as indicated by Functional Deficits. []? Progressing: []? Met: []? Not Met: []? Adjusted    ASSESSMENT:  See eval    Treatment/Activity Tolerance:  [x] Patient tolerated treatment well [] Patient limited by fatique  [] Patient limited by pain  [] Patient limited by other medical complications  [] Other:     Overall Progression Towards Functional goals/ Treatment Progress Update:  [] Patient is progressing as expected towards functional goals listed. [] Progression is slowed due to complexities/Impairments listed. [] Progression has been slowed due to co-morbidities. [x] Plan just implemented, too soon to assess goals progression <30days   [] Goals require adjustment due to lack of progress  [] Patient is not progressing as expected and requires additional follow up with physician  [] Other    Prognosis for POC: [x] Good [] Fair  [] Poor    Patient requires continued skilled intervention: [x] Yes  [] No      PLAN:           Reassess/ complete HEP consider DC  last Rx 2/11/21  Postural ed. And training, decrease forward head posture, improve shoulder postural alignment, decrease inflammation and improve RC strength   [x] Continue per plan of care [] Alter current plan (see comments)  [] Plan of care initiated [] Hold pending MD visit [] Discharge    Electronically signed by: Manuel Lara PTA     Note: If patient does not return for scheduled/recommended follow up visits, this note will serve as a discharge from care along with the most recent update on progress.

## 2021-02-24 ENCOUNTER — HOSPITAL ENCOUNTER (OUTPATIENT)
Dept: PHYSICAL THERAPY | Age: 76
Setting detail: THERAPIES SERIES
Discharge: HOME | End: 2021-02-24
Payer: MEDICARE

## 2021-02-24 PROCEDURE — 97140 MANUAL THERAPY 1/> REGIONS: CPT

## 2021-02-24 PROCEDURE — 97110 THERAPEUTIC EXERCISES: CPT

## 2021-02-24 NOTE — FLOWSHEET NOTE
[address]  Phone: [phone]   Fax:     [phone]        Physical Therapy Treatment Note/ Progress Report:       Date:  2021    Patient Name:  Brittney Mcgovern   \"Nicole\"    :  1945  MRN: [id number]    Pertinent Medical History:     Medical/Treatment Diagnosis Information:  Diagnosis: Tendinopathy of right rotator cuff (M67.911  Treatment Diagnosis: Right shoulder dysfunction due to rotator cuff functional impingement/ tendinitis    Insurance/Certification information:  PT Insurance Information: [facility]  Physician Information:  Referring Practitioner: Dr Marily Tyson of care signed (Y/N): routed 21    Date of Patient follow up with Physician: 3/15/21     Progress Report: []  Yes  [x]  No     Date Range for reporting period:  Beginnin2021  Ending:      Progress report due (10 Rx/or 30 days whichever is less): 81    Recertification due (POC duration/ or 90 days whichever is less):      Visit # POC/Insurance Allowable Auth Needed   9 BOMN []Yes    [x]No     Functional Outcomes Measure:   Date Assessed:  Test:  Score:     Pain level:0-1/10     History of Injury:     SUBJECTIVE:    21 Pt reports no pain as long as she is at rest.   21 Pt reports little to no pain at rest but has pain with \"just lifting arm up\". 21: Pt reports it is little better than the other day. \" It is not so hard to raise it up. \"  21: Patient reports shoulder has been a little more sore since she used it a little more. 21: Patient reports pain intensity decreasing,just still stiff. 21 Pt reports greater ease donning and doffing her coat.  21: Patient reports shoulder just feels stiff,overall is improving. States reaching behind back still has some difficulty. 21: Patient reports soreness comes and goes. reps - 1-2 sets - 5 hold - 2x daily - 7x weekly   · Standing Isometric Shoulder External Rotation with Doorway and Towel Roll - 15 reps - 1-2 sets - 5 hold - 2x daily - 7x weekly   1/22/21 Reviewed HEP from 1/19 Corrected tech. With posture and position of exercise. Tech. Otherwise was good. The patient demonstrated good tolerance to and understanding of the HEP. Written instructions have been issued.       Therapeutic Exercise and NMR EXR  [] (23682) Provided verbal/tactile cueing for activities related to strengthening, flexibility, endurance, ROM  for improvements in scapular, scapulothoracic and UE control with self care, reaching, carrying, lifting, house/yardwork, driving/computer work.    [] (05920) Provided verbal/tactile cueing for activities related to improving balance, coordination, kinesthetic sense, posture, motor skill, proprioception  to assist with  scapular, scapulothoracic and UE control with self care, reaching, carrying, lifting, house/yardwork, driving/computer work. Therapeutic Activities:    [] (38006 or 58087) Provided verbal/tactile cueing for activities related to improving balance, coordination, kinesthetic sense, posture, motor skill, proprioception and motor activation to allow for proper function of scapular, scapulothoracic and UE control with self care, carrying, lifting, driving/computer work.      Home Exercise Program:    [x] (22527) Reviewed/Progressed HEP activities related to strengthening, flexibility, endurance, ROM of scapular, scapulothoracic and UE control with self care, reaching, carrying, lifting, house/yardwork, driving/computer work  [] (05605) Reviewed/Progressed HEP activities related to improving balance, coordination, kinesthetic sense, posture, motor skill, proprioception of scapular, scapulothoracic and UE control with self care, reaching, carrying, lifting, house/yardwork, driving/computer work      Manual Treatments:  PROM / STM / Oscillations-Mobs:  GKeyurI, II, III, IV (Bishnu, Inf., Post.)  [] (55737) Provided manual therapy to mobilize soft tissue/joints of cervical/CT, scapular GHJ and UE for the purpose of modulating pain, promoting relaxation,  increasing ROM, reducing/eliminating soft tissue swelling/inflammation/restriction, improving soft tissue extensibility and allowing for proper ROM for normal function with self care, reaching, carrying, lifting, house/yardwork, driving/computer work    Charges:  Timed Code Treatment Minutes: 50   Total Treatment Minutes: 50   02/04/21 additional time available for rx. [] EVAL (LOW) 01884 (typically 20 minutes face-to-face)  [] EVAL (MOD) 72090 (typically 30 minutes face-to-face)  [] EVAL (HIGH) 46956 (typically 45 minutes face-to-face)  [] RE-EVAL     [x] HE(61196) x   1  [] Dry needle 1 or 2 Muscles (23143)  [] NMR (05912) x     [] Dry needle 3+ Muscles (13054)  [x] Manual (84054) x  2   [] Ultrasound (34577) x   [] TA (02470) x     [] Mech Traction (95165)  [] ES(attended) (72327)     [] ES (un) (91725):   [] Vasopump (50214) [] Ionto (65263)   [] Other:      GOALS:  Patient stated goal:\"to be rid of stiffness and weakness\"  []? Progressing: []? Met: []? Not Met: []? Adjusted     Therapist goals for Patient:   Short Term Goals: To be achieved in: 2 weeks  1. Independent in HEP and progression per patient tolerance, in order to prevent re-injury. []? Progressing: []? Met: []? Not Met: []? Adjusted  2. Patient will have a decrease in pain 0-4/10 to facilitate improvement in movement, function, and ADLs as indicated by Functional Deficits. []? Progressing: []? Met: []? Not Met: []? Adjusted     Long Term Goals: To be achieved in: 6 weeks  1. Disability index score of 16 or less for the Quick DASH to assist with reaching prior level of function. []? Progressing: []? Met: []? Not Met: []? Adjusted  2.  Patient will demonstrate increased AROM to 10 each flexion and abduction  to allow for proper joint functioning as indicated by Functional Deficits. []? Progressing: []? Met: []? Not Met: []? Adjusted  3. Patient will return to   activities without increased symptoms or restriction. []? Progressing: []? Met: []? Not Met: []? Adjusted  4 . Patient will have a decrease in pain 0-4/10 to facilitate improvement in movement, function, and ADLs as indicated by Functional Deficits. []? Progressing: []? Met: []? Not Met: []? Adjusted    ASSESSMENT:  See eval    Treatment/Activity Tolerance:  [x] Patient tolerated treatment well [] Patient limited by fatique  [] Patient limited by pain  [] Patient limited by other medical complications  [] Other:     Overall Progression Towards Functional goals/ Treatment Progress Update:  [] Patient is progressing as expected towards functional goals listed. [] Progression is slowed due to complexities/Impairments listed. [] Progression has been slowed due to co-morbidities. [x] Plan just implemented, too soon to assess goals progression <30days   [] Goals require adjustment due to lack of progress  [] Patient is not progressing as expected and requires additional follow up with physician  [] Other    Prognosis for POC: [x] Good [] Fair  [] Poor    Patient requires continued skilled intervention: [x] Yes  [] No      PLAN:           Reassess/ complete HEP consider DC  last Rx 2/11/21  Postural ed. And training, decrease forward head posture, improve shoulder postural alignment, decrease inflammation and improve RC strength   [x] Continue per plan of care [] Alter current plan (see comments)  [] Plan of care initiated [] Hold pending MD visit [] Discharge    Electronically signed by: Savage Juarez PTA     Note: If patient does not return for scheduled/recommended follow up visits, this note will serve as a discharge from care along with the most recent update on progress.

## 2021-03-01 ENCOUNTER — HOSPITAL ENCOUNTER (OUTPATIENT)
Dept: PHYSICAL THERAPY | Age: 76
Setting detail: THERAPIES SERIES
Discharge: HOME | End: 2021-03-01
Payer: MEDICARE

## 2021-03-01 PROCEDURE — 97110 THERAPEUTIC EXERCISES: CPT

## 2021-03-01 PROCEDURE — 97140 MANUAL THERAPY 1/> REGIONS: CPT

## 2021-03-01 NOTE — FLOWSHEET NOTE
· Seated Scapular Retraction - 10 reps - 1-2 sets - 5 hold - 2x daily - 7x weekly   · Isometric Shoulder Abduction at Wall - 15 reps - 1-2 sets - 5 hold - 2x daily - 7x weekly   · Standing Isometric Shoulder Internal Rotation at Doorway - 15 reps - 1-2 sets - 5 hold - 2x daily - 7x weekly   · Standing Isometric Shoulder External Rotation with Doorway and Towel Roll - 15 reps - 1-2 sets - 5 hold - 2x daily - 7x weekly   1/22/21 Reviewed HEP from 1/19 Corrected tech. With posture and position of exercise. Tech. Otherwise was good. Access Code: [email or web address]   URL: [email or web address]   Date: 03/01/2021   Prepared by: Jojo Baez     Exercises   Standing Row with Resistance with Anchored Resistance at Chest Height Palms Down - 10 reps - 3 sets - 3 hold - 1-2x daily - 3x weekly   Shoulder Internal Rotation with Resistance - 10 reps - 3 sets - 3 hold - 1-2x daily - 3x weekly   Shoulder External Rotation with Anchored Resistance - 10 reps - 3 sets - 3 hold - 1-2x daily - 3x weekly     The patient demonstrated good tolerance to and understanding of the HEP. Written instructions have been issued.       Therapeutic Exercise and NMR EXR  [] (63674) Provided verbal/tactile cueing for activities related to strengthening, flexibility, endurance, ROM  for improvements in scapular, scapulothoracic and UE control with self care, reaching, carrying, lifting, house/yardwork, driving/computer work.    [] (10560) Provided verbal/tactile cueing for activities related to improving balance, coordination, kinesthetic sense, posture, motor skill, proprioception  to assist with  scapular, scapulothoracic and UE control with self care, reaching, carrying, lifting, house/yardwork, driving/computer work.     Therapeutic Activities:    [] (67880 or 06970) Provided verbal/tactile cueing for activities related to improving balance, coordination, kinesthetic sense, posture, motor skill, proprioception and motor activation to allow for Goals: To be achieved in: 2 weeks  1. Independent in HEP and progression per patient tolerance, in order to prevent re-injury. []? Progressing: []? Met: []? Not Met: []? Adjusted  2. Patient will have a decrease in pain 0-4/10 to facilitate improvement in movement, function, and ADLs as indicated by Functional Deficits. []? Progressing: []? Met: []? Not Met: []? Adjusted     Long Term Goals: To be achieved in: 6 weeks  1. Disability index score of 16 or less for the Quick DASH to assist with reaching prior level of function. []? Progressing: []? Met: []? Not Met: []? Adjusted  2. Patient will demonstrate increased AROM to 10 each flexion and abduction  to allow for proper joint functioning as indicated by Functional Deficits. []? Progressing: []? Met: []? Not Met: []? Adjusted  3. Patient will return to   activities without increased symptoms or restriction. []? Progressing: []? Met: []? Not Met: []? Adjusted  4 . Patient will have a decrease in pain 0-4/10 to facilitate improvement in movement, function, and ADLs as indicated by Functional Deficits. []? Progressing: []? Met: []? Not Met: []? Adjusted    ASSESSMENT:  See eval    Treatment/Activity Tolerance:  [x] Patient tolerated treatment well [] Patient limited by fatique  [] Patient limited by pain  [] Patient limited by other medical complications  [] Other:     Overall Progression Towards Functional goals/ Treatment Progress Update:  [] Patient is progressing as expected towards functional goals listed. [] Progression is slowed due to complexities/Impairments listed. [] Progression has been slowed due to co-morbidities.   [x] Plan just implemented, too soon to assess goals progression <30days   [] Goals require adjustment due to lack of progress  [] Patient is not progressing as expected and requires additional follow up with physician  [] Other    Prognosis for POC: [x] Good [] Fair  [] Poor    Patient requires continued skilled intervention: [x] Yes  [] No      PLAN:           Reassess/ complete HEP consider DC  last Rx 3/3/21  Postural ed. And training, decrease forward head posture, improve shoulder postural alignment, decrease inflammation and improve RC strength   [x] Continue per plan of care [] Alter current plan (see comments)  [] Plan of care initiated [] Hold pending MD visit [] Discharge    Electronically signed by: Maegan Cano PT     Note: If patient does not return for scheduled/recommended follow up visits, this note will serve as a discharge from care along with the most recent update on progress.

## 2021-03-03 ENCOUNTER — HOSPITAL ENCOUNTER (OUTPATIENT)
Dept: PHYSICAL THERAPY | Age: 76
Setting detail: THERAPIES SERIES
Discharge: HOME | End: 2021-03-03
Payer: MEDICARE

## 2021-03-03 PROCEDURE — 97110 THERAPEUTIC EXERCISES: CPT

## 2021-03-03 PROCEDURE — 97140 MANUAL THERAPY 1/> REGIONS: CPT

## 2021-03-03 NOTE — FLOWSHEET NOTE
[facility]. [address]  Phone: [phone]   Fax:     [phone]     Physical Therapy Discharge Summary    Dear  ,Dr Lisbeth Pierce    We had the pleasure of treating the following patient for physical therapy services at [address] [place] [address]. A summary of our findings can be found in the discharge summary below. If you have any questions or concerns regarding these findings, please do not hesitate to contact me at the office phone number above.   Thank you for the referral.     Physician Signature:________________________________Date:__________________  By signing above (or electronic signature), therapists plan is approved by physician      Overall Response to Treatment:   []Patient is responding well to treatment and improvement is noted with regards  to goals   []Patient should continue to improve in reasonable time if they continue HEP   [x]Patient has plateaued and is no longer responding to skilled PT intervention    []Patient is getting worse and would benefit from return to referring MD   []Patient unable to adhere to initial POC   []Other:     Date range of Visits: 21 to 3/3/21  Total Visits: 11    Physical Therapy Treatment Note/ Progress Report:       Date:  3/3/2021    Patient Name:  Ludin Eldridge   \"Nicole\"    :  1945  MRN: [id number]    Pertinent Medical History:     Medical/Treatment Diagnosis Information:  Diagnosis: Tendinopathy of right rotator cuff (M67.911  Treatment Diagnosis: Right shoulder dysfunction due to rotator cuff functional impingement/ tendinitis    Insurance/Certification information:  [facility]  Physician Information:  Referring Practitioner: Dr Toñito Vazquez of care signed (Y/N): routed 21    Date of Patient follow up with Physician: 3/15/21     Progress Report: []  Yes  [x]  No     Date Range for reporting period:  Beginning:  3/3/2021  Ending:      Progress report due (10 Rx/or 30 days whichever is less): 0/11/60    Recertification due (POC duration/ or 90 days whichever is less):      Visit # POC/Insurance Allowable Auth Needed   11 BOMN []Yes    [x]No     Functional Outcomes Measure:   Date Assessed:  Test:  Score:     Pain level:0-1/10     History of Injury:     SUBJECTIVE:    1/22/21 Pt reports no pain as long as she is at rest.   1/25/21 Pt reports little to no pain at rest but has pain with \"just lifting arm up\". 1/28/21: Pt reports it is little better than the other day. \" It is not so hard to raise it up. \"  02/01/21: Patient reports shoulder has been a little more sore since she used it a little more. 02/04/21: Patient reports pain intensity decreasing,just still stiff. 2/8/21 Pt reports greater ease donning and doffing her coat.  02/17/21: Patient reports shoulder just feels stiff,overall is improving. States reaching behind back still has some difficulty. 02/24/21: Patient reports soreness comes and goes. 3/1/21 Patient reports her shoulder pain is about the same. Her exercises help to limber the shoulder up some but then it just get's stiff again. She can get dressed a little easier. 3/3/21 Pt reports her shoulder feels better today, \"It feels less stiff\".        OBJECTIVE:    Observation:    Test measurements:    ROM:  Right shoulder   Date      Shldr flexion    Shldr abd  Shldr IR         Shldr ER   A P A P A P A P   Eval 125  115  55  85    3/1/21  129  120  60  85                            Strength:  Date Shoulder flexion Shoulder abduction Shoulder IR Shoulder  ER Bicep   Eval        3/1/21 4+ 4+ 5 4+ 4+                       RESTRICTIONS/PRECAUTIONS: none    Exercises/Interventions:   Therapeutic Ex (21720)  Min: Resistance/Reps Cues/Notes   T slide  Rows  Ext  Add  IR  ER   Blue 3\" 30 x   Blue 3\" 30 x  red 3 \" x 30   Blue 20 x2  Yellow  20 x 2 1/28/21: Added for HEP and gave pt lime band for use at home   Corner  stretch  3 way 12 breath cycles each  Suzette. Well             Mat Ex     SL'ing ranger with stretch to scap muscles      Prone      SL'ing ER 1# 10 x 3    Chin tucks           Manual Intervention  (26123)  Min: 20 min    IASTM R supraspinatus, infraspinatus , teres muscles, pectoralis muscles     shld mobs Gh inf post mobs gr 2     stretching PROM    DTM R  Supraspinatus, infraspinatus  and teres muscles          NMR re-education (09331)  Min:               Therapeutic Activity (58552)  Min:     Discussed keeping hands lower than eye level with work and avoidance of repeated motions with weight bearing to avoid irritation to shoulder(scrubbing and vacuuming ) Pt verbalized understanding. Seated in Neutral spine  Pt demonstrated good tolerance. She was advised to use good posture with work and seated activities for the benefit of her shoulders and spine. . she verbalized understanding. Modalities:  Min     Pulsed US                      Other Therapeutic Activities:  Pt was educated on PT POC, Diagnosis, Prognosis, pathomechanics as well as frequency and duration of scheduling future physical therapy appointments. Time was also taken on this day to answer all patient questions and participation in PT. Reviewed appointment policy in detail with patient and patient verbalized understanding. Home Exercise Program:  HEP instruction: Access Code: [email or web address]   URL: [email or web address]   Date: 01/19/2021   Prepared by: May Merlin     Exercises   · Seated Scapular Retraction - 10 reps - 1-2 sets - 5 hold - 2x daily - 7x weekly   · Isometric Shoulder Abduction at Wall - 15 reps - 1-2 sets - 5 hold - 2x daily - 7x weekly   · Standing Isometric Shoulder Internal Rotation at Doorway - 15 reps - 1-2 sets - 5 hold - 2x daily - 7x weekly   · Standing Isometric Shoulder External Rotation with Doorway and Towel Roll - 15 reps - 1-2 sets - 5 hold - 2x daily - 7x weekly   1/22/21 Reviewed HEP from 1/19 Corrected tech. With posture and position of exercise. Tech. Otherwise was good. Access Code: [email or web address]   URL: [email or web address]   Date: 03/01/2021   Prepared by: Radha Mcgraw     Exercises   Standing Row with Resistance with Anchored Resistance at Chest Height Palms Down - 10 reps - 3 sets - 3 hold - 1-2x daily - 3x weekly   Shoulder Internal Rotation with Resistance - 10 reps - 3 sets - 3 hold - 1-2x daily - 3x weekly   Shoulder External Rotation with Anchored Resistance - 10 reps - 3 sets - 3 hold - 1-2x daily - 3x weekly     The patient demonstrated good tolerance to and understanding of the HEP. Written instructions have been issued.       Therapeutic Exercise and NMR EXR  [] (81198) Provided verbal/tactile cueing for activities related to strengthening, flexibility, endurance, ROM  for improvements in scapular, scapulothoracic and UE control with self care, reaching, carrying, lifting, house/yardwork, driving/computer work.    [] (00865) Provided verbal/tactile cueing for activities related to improving balance, coordination, kinesthetic sense, posture, motor skill, proprioception  to assist with  scapular, scapulothoracic and UE control with self care, reaching, carrying, lifting, house/yardwork, driving/computer work. Therapeutic Activities:    [] (20971 or 30287) Provided verbal/tactile cueing for activities related to improving balance, coordination, kinesthetic sense, posture, motor skill, proprioception and motor activation to allow for proper function of scapular, scapulothoracic and UE control with self care, carrying, lifting, driving/computer work.      Home Exercise Program:    [x] (69100) Reviewed/Progressed HEP activities related to strengthening, flexibility, endurance, ROM of scapular, scapulothoracic and UE control with self care, reaching, carrying, lifting, house/yardwork, driving/computer work  [] (22261) Reviewed/Progressed HEP activities related to improving balance, coordination, kinesthetic sense, posture, motor skill, proprioception of scapular, scapulothoracic and UE control with self care, reaching, carrying, lifting, house/yardwork, driving/computer work      Manual Treatments:  PROM / STM / Oscillations-Mobs:  G-I, II, III, IV (PA's, Inf., Post.)  [] (45200) Provided manual therapy to mobilize soft tissue/joints of cervical/CT, scapular GHJ and UE for the purpose of modulating pain, promoting relaxation,  increasing ROM, reducing/eliminating soft tissue swelling/inflammation/restriction, improving soft tissue extensibility and allowing for proper ROM for normal function with self care, reaching, carrying, lifting, house/yardwork, driving/computer work    Charges:  Timed Code Treatment Minutes: 50   Total Treatment Minutes: 50   02/04/21 additional time available for rx. [] EVAL (LOW) 01972 (typically 20 minutes face-to-face)  [] EVAL (MOD) 11705 (typically 30 minutes face-to-face)  [] EVAL (HIGH) 10691 (typically 45 minutes face-to-face)  [] RE-EVAL     [x] GE(03824) x   1  [] Dry needle 1 or 2 Muscles (38872)  [] NMR (04491) x     [] Dry needle 3+ Muscles (66546)  [x] Manual (23904) x  2   [] Ultrasound (21010) x   [] TA (87446) x     [] Mech Traction (88802)  [] ES(attended) (58900)     [] ES (un) (83492):   [] Vasopump (38017) [] Ionto (01242)   [] Other:      GOALS:  Patient stated goal:\"to be rid of stiffness and weakness\"  []? Progressing: []? Met: []? Not Met: [x]? partially MET     Therapist goals for Patient:   Short Term Goals: To be achieved in: 2 weeks  1. Independent in HEP and progression per patient tolerance, in order to prevent re-injury. []? Progressing: [x]? Met: []? Not Met: []? Adjusted  2. Patient will have a decrease in pain 0-4/10 to facilitate improvement in movement, function, and ADLs as indicated by Functional Deficits. []? Progressing: [x]? Met: []? Not Met: []? Adjusted     Long Term Goals: To be achieved in: 6 weeks  1. Disability index score of 16 or less for the Quick DASH to assist with reaching prior level of function. []? Progressing: []? Met: [x]? Not Met: []? Adjusted  2. Patient will demonstrate increased AROM to 10 each flexion and abduction  to allow for proper joint functioning as indicated by Functional Deficits. []? Progressing: []? Met: [x]? Not Met: []? Adjusted  3. Patient will return to   activities without increased symptoms or restriction. []? Progressing: [x]? Met: []? Not Met: []? Adjusted  4 . Patient will have a decrease in pain 0-4/10 to facilitate improvement in movement, function, and ADLs as indicated by Functional Deficits. []? Progressing: [x]? Met: []? Not Met: []? Adjusted    ASSESSMENT:  See eval    Treatment/Activity Tolerance:  [x] Patient tolerated treatment well [] Patient limited by fatique  [] Patient limited by pain  [] Patient limited by other medical complications  [] Other:     Overall Progression Towards Functional goals/ Treatment Progress Update:  [] Patient is progressing as expected towards functional goals listed. [] Progression is slowed due to complexities/Impairments listed. [] Progression has been slowed due to co-morbidities. [x] Plan just implemented, too soon to assess goals progression <30days   [] Goals require adjustment due to lack of progress  [] Patient is not progressing as expected and requires additional follow up with physician  [] Other    Prognosis for POC: [x] Good [] Fair  [] Poor    Patient requires continued skilled intervention: [] Yes  [x] No      PLAN:             Postural ed.  And training, decrease forward head posture, improve shoulder postural alignment, decrease inflammation and improve RC strength   [] Continue per plan of care [] Alter current plan (see comments)  [] Plan of care initiated [] Hold pending MD visit [x] Discharge    Electronically signed by: Remy Napoles, PT     Note: If patient does not return for scheduled/recommended follow up visits, this note will serve as a discharge from care along with the most recent update on progress.

## 2021-03-15 ENCOUNTER — OFFICE VISIT (OUTPATIENT)
Dept: ORTHOPEDIC SURGERY | Age: 76
End: 2021-03-15
Payer: MEDICARE

## 2021-03-15 VITALS — TEMPERATURE: 97.1 F | BODY MASS INDEX: 24.11 KG/M2 | RESPIRATION RATE: 14 BRPM | HEIGHT: 66 IN | WEIGHT: 150 LBS

## 2021-03-15 DIAGNOSIS — M67.911 TENDINOPATHY OF RIGHT ROTATOR CUFF: Primary | ICD-10-CM

## 2021-03-15 PROCEDURE — 20610 DRAIN/INJ JOINT/BURSA W/O US: CPT | Performed by: ORTHOPAEDIC SURGERY

## 2021-03-15 PROCEDURE — 99213 OFFICE O/P EST LOW 20 MIN: CPT | Performed by: ORTHOPAEDIC SURGERY

## 2021-03-15 RX ORDER — BUPIVACAINE HYDROCHLORIDE 2.5 MG/ML
4 INJECTION, SOLUTION INFILTRATION; PERINEURAL ONCE
Status: COMPLETED | OUTPATIENT
Start: 2021-03-15 | End: 2021-03-15

## 2021-03-15 RX ORDER — LIDOCAINE HYDROCHLORIDE 10 MG/ML
4 INJECTION, SOLUTION INFILTRATION; PERINEURAL ONCE
Status: COMPLETED | OUTPATIENT
Start: 2021-03-15 | End: 2021-03-15

## 2021-03-15 RX ORDER — TRIAMCINOLONE ACETONIDE 40 MG/ML
40 INJECTION, SUSPENSION INTRA-ARTICULAR; INTRAMUSCULAR ONCE
Status: COMPLETED | OUTPATIENT
Start: 2021-03-15 | End: 2021-03-15

## 2021-03-15 RX ADMIN — LIDOCAINE HYDROCHLORIDE 4 ML: 10 INJECTION, SOLUTION INFILTRATION; PERINEURAL at 13:15

## 2021-03-15 RX ADMIN — BUPIVACAINE HYDROCHLORIDE 10 MG: 2.5 INJECTION, SOLUTION INFILTRATION; PERINEURAL at 13:15

## 2021-03-15 RX ADMIN — TRIAMCINOLONE ACETONIDE 40 MG: 40 INJECTION, SUSPENSION INTRA-ARTICULAR; INTRAMUSCULAR at 13:18

## 2021-09-02 ENCOUNTER — OFFICE VISIT (OUTPATIENT)
Dept: FAMILY MEDICINE CLINIC | Age: 76
End: 2021-09-02
Payer: MEDICARE

## 2021-09-02 VITALS
RESPIRATION RATE: 12 BRPM | HEART RATE: 68 BPM | OXYGEN SATURATION: 96 % | WEIGHT: 147.6 LBS | TEMPERATURE: 97.8 F | DIASTOLIC BLOOD PRESSURE: 64 MMHG | SYSTOLIC BLOOD PRESSURE: 130 MMHG | BODY MASS INDEX: 23.72 KG/M2 | HEIGHT: 66 IN

## 2021-09-02 DIAGNOSIS — F51.04 PSYCHOPHYSIOLOGICAL INSOMNIA: ICD-10-CM

## 2021-09-02 DIAGNOSIS — Z00.00 ROUTINE GENERAL MEDICAL EXAMINATION AT A HEALTH CARE FACILITY: Primary | ICD-10-CM

## 2021-09-02 DIAGNOSIS — Z23 NEED FOR SHINGLES VACCINE: ICD-10-CM

## 2021-09-02 DIAGNOSIS — Z12.31 ENCOUNTER FOR SCREENING MAMMOGRAM FOR MALIGNANT NEOPLASM OF BREAST: ICD-10-CM

## 2021-09-02 PROCEDURE — G0438 PPPS, INITIAL VISIT: HCPCS | Performed by: NURSE PRACTITIONER

## 2021-09-02 RX ORDER — VITAMIN B COMPLEX
2 CAPSULE ORAL DAILY
COMMUNITY

## 2021-09-02 RX ORDER — TRAZODONE HYDROCHLORIDE 100 MG/1
TABLET ORAL
Qty: 60 TABLET | Refills: 0 | Status: SHIPPED | OUTPATIENT
Start: 2021-09-02 | End: 2022-01-18 | Stop reason: ALTCHOICE

## 2021-09-02 RX ORDER — MULTIVITAMIN
1 TABLET ORAL DAILY
COMMUNITY

## 2021-09-02 RX ORDER — ZOSTER VACCINE RECOMBINANT, ADJUVANTED 50 MCG/0.5
0.5 KIT INTRAMUSCULAR SEE ADMIN INSTRUCTIONS
Qty: 0.5 ML | Refills: 0 | Status: SHIPPED | OUTPATIENT
Start: 2021-09-02 | End: 2022-01-19

## 2021-09-02 SDOH — ECONOMIC STABILITY: FOOD INSECURITY: WITHIN THE PAST 12 MONTHS, YOU WORRIED THAT YOUR FOOD WOULD RUN OUT BEFORE YOU GOT MONEY TO BUY MORE.: NEVER TRUE

## 2021-09-02 SDOH — ECONOMIC STABILITY: FOOD INSECURITY: WITHIN THE PAST 12 MONTHS, THE FOOD YOU BOUGHT JUST DIDN'T LAST AND YOU DIDN'T HAVE MONEY TO GET MORE.: NEVER TRUE

## 2021-09-02 SDOH — ECONOMIC STABILITY: TRANSPORTATION INSECURITY
IN THE PAST 12 MONTHS, HAS THE LACK OF TRANSPORTATION KEPT YOU FROM MEDICAL APPOINTMENTS OR FROM GETTING MEDICATIONS?: NO

## 2021-09-02 SDOH — ECONOMIC STABILITY: TRANSPORTATION INSECURITY
IN THE PAST 12 MONTHS, HAS LACK OF TRANSPORTATION KEPT YOU FROM MEETINGS, WORK, OR FROM GETTING THINGS NEEDED FOR DAILY LIVING?: NO

## 2021-09-02 ASSESSMENT — PATIENT HEALTH QUESTIONNAIRE - PHQ9
SUM OF ALL RESPONSES TO PHQ QUESTIONS 1-9: 0
SUM OF ALL RESPONSES TO PHQ9 QUESTIONS 1 & 2: 0
1. LITTLE INTEREST OR PLEASURE IN DOING THINGS: 0
2. FEELING DOWN, DEPRESSED OR HOPELESS: 0
SUM OF ALL RESPONSES TO PHQ QUESTIONS 1-9: 0
SUM OF ALL RESPONSES TO PHQ QUESTIONS 1-9: 0

## 2021-09-02 ASSESSMENT — SOCIAL DETERMINANTS OF HEALTH (SDOH): HOW HARD IS IT FOR YOU TO PAY FOR THE VERY BASICS LIKE FOOD, HOUSING, MEDICAL CARE, AND HEATING?: NOT HARD AT ALL

## 2021-09-02 ASSESSMENT — LIFESTYLE VARIABLES: HOW OFTEN DO YOU HAVE A DRINK CONTAINING ALCOHOL: 0

## 2021-09-02 NOTE — PATIENT INSTRUCTIONS
Patient Education     - Reviewed medical and social history together. Discussed wide range of preventive recommendations for Medicare population, including fall prevention, exercise, recommendations for healthy produce-based diet, vaccines and routine screening tests, addressing all care gaps. Insomnia: Care Instructions  Your Care Instructions     Insomnia is the inability to sleep well. It is a common problem for most people at some time. Insomnia may make it hard for you to get to sleep, stay asleep, or sleep as long as you need to. This can make you tired and grouchy during the day. It can also make you forgetful, less effective at work, and unhappy. Insomnia can be caused by conditions such as depression or anxiety. Pain can also affect your ability to sleep. When these problems are solved, the insomnia usually clears up. But sometimes bad sleep habits can cause insomnia. If insomnia is affecting your work or your enjoyment of life, you can take steps to improve your sleep. Follow-up care is a key part of your treatment and safety. Be sure to make and go to all appointments, and call your doctor if you are having problems. It's also a good idea to know your test results and keep a list of the medicines you take. How can you care for yourself at home? What to avoid   · Do not have drinks with caffeine, such as coffee or black tea, for 8 hours before bed. · Do not smoke or use other types of tobacco near bedtime. Nicotine is a stimulant and can keep you awake. · Avoid drinking alcohol late in the evening, because it can cause you to wake in the middle of the night. · Do not eat a big meal close to bedtime. If you are hungry, eat a light snack. · Do not drink a lot of water close to bedtime, because the need to urinate may wake you up during the night. · Do not read or watch TV in bed. Use the bed only for sleeping and sexual activity.   What to try   · Go to bed at the same time every night, and wake up at the same time every morning. Do not take naps during the day. · Keep your bedroom quiet, dark, and cool. · Sleep on a comfortable pillow and mattress. · If watching the clock makes you anxious, turn it facing away from you so you cannot see the time. · If you worry when you lie down, start a worry book. Well before bedtime, write down your worries, and then set the book and your concerns aside. · Try meditation or other relaxation techniques before you go to bed. · If you cannot fall asleep, get up and go to another room until you feel sleepy. Do something relaxing. Repeat your bedtime routine before you go to bed again. · Make your house quiet and calm about an hour before bedtime. Turn down the lights, turn off the TV, log off the computer, and turn down the volume on music. This can help you relax after a busy day. When should you call for help? Watch closely for changes in your health, and be sure to contact your doctor if:    · Your efforts to improve your sleep do not work.     · Your insomnia gets worse.     · You have been feeling down, depressed, or hopeless or have lost interest in things that you usually enjoy. Where can you learn more? Go to https://SquareHub.Hakia. org and sign in to your O-film account. Enter P513 in the Wattpad box to learn more about \"Insomnia: Care Instructions. \"     If you do not have an account, please click on the \"Sign Up Now\" link. Current as of: August 31, 2020               Content Version: 12.9  © 2006-2021 Healthwise, Incorporated. Care instructions adapted under license by [facility] ([facility]). If you have questions about a medical condition or this instruction, always ask your healthcare professional. [place] [address] any warranty or liability for your use of this information. Personalized Preventive Plan for Lizzeth Lowe - 9/2/2021  Medicare offers a range of preventive health benefits.  Some

## 2021-09-02 NOTE — PROGRESS NOTES
Medicare Annual Wellness Visit  Name: Piper Older Date: 2021   MRN: <[id number]> Sex: Female   Age: 77 y.o. Ethnicity: Non- / Non    : 1945 Race: White (non-)      Ladan Dense is here for Allegra MARIEV    Screenings for behavioral, psychosocial and functional/safety risks, and cognitive dysfunction are all negative except as indicated below. These results, as well as other patient data from the [address] form, are documented in Flowsheets linked to this Encounter. PT STATES SHE HAS HARD TIME FALLING ASLEEP SHE HAS TRIED SEVERAL OTC MEDICATIONS  No Known Allergies    Prior to Visit Medications    Medication Sig Taking? Authorizing Provider   Multiple Vitamin (MULTIVITAMIN) tablet Take 1 tablet by mouth daily Yes Historical Provider, MD   BIOTIN PO Take 1 tablet by mouth daily Yes Historical Provider, MD   Nutritional Supplements (MENOPAUSE FORMULA PO) Take 1 tablet by mouth daily Yes Historical Provider, MD   b complex vitamins capsule Take 3 capsules by mouth daily Yes Historical Provider, MD   Multiple Vitamins-Minerals (EYE VITAMINS PO) Take 1 tablet by mouth daily Yes Historical Provider, MD       History reviewed. No pertinent past medical history. Past Surgical History:   Procedure Laterality Date    BREAST LUMPECTOMY Right     2004    ELBOW SURGERY      PARATHYROID GLAND SURGERY  2008       History reviewed. No pertinent family history.     CareTeam (Including outside providers/suppliers regularly involved in providing care):   Patient Care Team:  Coby Cano MD as PCP - General    Wt Readings from Last 3 Encounters:   21 147 lb 9.6 oz (67 kg)   03/15/21 150 lb (68 kg)   21 150 lb (68 kg)     Vitals:    21 1435   BP: 130/64   Site: Left Upper Arm   Position: Sitting   Cuff Size: Medium Adult   Pulse: 68   Resp: 12   Temp: 97.8 °F (36.6 °C)   TempSrc: Infrared   SpO2: 96%   Weight: 147 lb 9.6 oz (67 kg)     Body mass index is 23.82 kg/m². Based upon direct observation of the patient, evaluation of cognition reveals recent and remote memory intact. General Appearance: alert and oriented to person, place and time, well-developed and well-nourished, in no acute distress  Skin: warm and dry, no rash or erythema  Pulmonary/Chest: clear to auscultation bilaterally- no wheezes, rales or rhonchi, normal air movement, no respiratory distress  Cardiovascular: normal rate, normal S1 and S2, no gallops and no carotid bruits    Patient's complete Health Risk Assessment and screening values have been reviewed and are found in Flowsheets. The following problems were reviewed today and where indicated follow up appointments were made and/or referrals ordered. Positive Risk Factor Screenings with Interventions:     Fall Risk:  2 or more falls in past year?: no  Fall with injury in past year?: (!) yes- SHE FELL OFF A STOOL AND IT BROKE - L1 FRACTURE  Fall Risk Interventions:    · Home safety tips provided        General Health and ACP:  General  In general, how would you say your health is?: Very Good  In the past 7 days, have you experienced any of the following?  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- IT WAS BROKEN AND HER  FRIEND FIXED THIS AND SHE FEELS BETTER KNOW  Do you get the social and emotional support that you need?: Yes  Do you have a Living Will?: (!) No  Advance Directives     Power of  Living Will ACP-Advance Directive ACP-Power of     Not on File Not on File Not on File Not on File      General Health Risk Interventions:  · Loneliness: patient declines any further intervention for this issue  · No Living Will: Advance Care Planning addressed with patient today and COPY OF LIVING WILL PAPERWORK MAILED TO PT        Personalized Preventive Plan   Current Health Maintenance Status  Immunization History   Administered Date(s) Administered    COVID-19, Pfizer, PF, 30mcg/0.3mL 03/03/2021, 03/24/2021        Health Maintenance   Topic Date Due    Hepatitis C screen  Never done    Colon cancer screen colonoscopy  Never done    Shingles Vaccine (1 of 2) Never done    DEXA (modify frequency per FRAX score)  Never done    Annual Wellness Visit (AWV)  Never done    A1C test (Diabetic or Prediabetic)  10/09/2019    DTaP/Tdap/Td vaccine (2 - Td or Tdap) 07/27/2021    Flu vaccine (1) Never done    Breast cancer screen  10/23/2022    Lipid screen  10/09/2023    Pneumococcal 65+ years Vaccine  Completed    COVID-19 Vaccine  Completed    Hepatitis A vaccine  Aged Out    Hepatitis B vaccine  Aged Out    Hib vaccine  Aged Out    Meningococcal (ACWY) vaccine  Aged Out     Recommendations for [facility] Due: see orders and patient instructions/AVS.  . Recommended screening schedule for the next 5-10 years is provided to the patient in written form: see Patient Danny Wilson was seen today for medicare awv. Diagnoses and all orders for this visit:    Routine general medical examination at a health care facility  - Reviewed medical and social history together. Discussed wide range of preventive recommendations for Medicare population, including fall prevention, exercise, recommendations for healthy produce-based diet, vaccines and routine screening tests, addressing all care gaps. Encounter for screening mammogram for malignant neoplasm of breast  -     RANDY DIGITAL SCREEN W OR WO CAD BILATERAL; Future    Psychophysiological insomnia  -     traZODone (DESYREL) 100 MG tablet; 1-2 TABS NIGHTLY AS NEEDED  SE DW PT    Need for shingles vaccine  -     zoster recombinant adjuvanted vaccine (SHINGRIX) 50 MCG/0.5ML SUSR injection;  Inject 0.5 mLs into the muscle See Admin Instructions 1 dose now and repeat in 2-6 months      COLON CANCER SCREENING 2015 -

## 2021-10-07 ENCOUNTER — OFFICE VISIT (OUTPATIENT)
Dept: VASCULAR SURGERY | Age: 76
End: 2021-10-07
Payer: MEDICARE

## 2021-10-07 VITALS — BODY MASS INDEX: 23.63 KG/M2 | HEIGHT: 66 IN | WEIGHT: 147 LBS

## 2021-10-07 DIAGNOSIS — I83.893 SYMPTOMATIC VARICOSE VEINS OF BOTH LOWER EXTREMITIES: Primary | ICD-10-CM

## 2021-10-07 PROCEDURE — 99204 OFFICE O/P NEW MOD 45 MIN: CPT | Performed by: SURGERY

## 2021-10-07 ASSESSMENT — ENCOUNTER SYMPTOMS
GASTROINTESTINAL NEGATIVE: 1
ALLERGIC/IMMUNOLOGIC NEGATIVE: 1
EYE PAIN: 1
BACK PAIN: 1
RESPIRATORY NEGATIVE: 1

## 2021-10-07 NOTE — PROGRESS NOTES
Subjective:      Patient ID: Vickie Connelly is a 77 y.o. female. HPI Referral from Bella Swain CNP for evaluation of the large varicose veins and associated discomfort described as throbbing aching discomfort in her legs after standing all day long to the point where she has difficulty sleeping at night when the veins continue throbbing. Also reports heaviness and fatigue in the legs bilaterally. Equivalent symptoms bilaterally. Worse with standing and improved with elevation, over-the-counter compression stockings and exercise. No history of SVT, bleeding, ulceration, dermatitis or uncontrolled swelling. No known family history. No previous venous interventions. History reviewed. No pertinent past medical history. Past Surgical History:   Procedure Laterality Date    BREAST LUMPECTOMY Right     2004    ELBOW SURGERY      PARATHYROID GLAND SURGERY  2008     No Known Allergies  Current Outpatient Medications   Medication Sig Dispense Refill    Multiple Vitamin (MULTIVITAMIN) tablet Take 1 tablet by mouth daily      BIOTIN PO Take 1 tablet by mouth daily      Nutritional Supplements (MENOPAUSE FORMULA PO) Take 1 tablet by mouth daily      b complex vitamins capsule Take 3 capsules by mouth daily      Multiple Vitamins-Minerals (EYE VITAMINS PO) Take 1 tablet by mouth daily      zoster recombinant adjuvanted vaccine (SHINGRIX) 50 MCG/0.5ML SUSR injection Inject 0.5 mLs into the muscle See Admin Instructions 1 dose now and repeat in 2-6 months 0.5 mL 0    traZODone (DESYREL) 100 MG tablet 1-2 TABS NIGHTLY AS NEEDED 60 tablet 0     No current facility-administered medications for this visit.      Social History     Socioeconomic History    Marital status: Single     Spouse name: Not on file    Number of children: Not on file    Years of education: Not on file    Highest education level: Not on file   Occupational History    Not on file   Tobacco Use    Smoking status: Never Smoker    HENT:      Head: Normocephalic and atraumatic. Right Ear: External ear normal.      Left Ear: External ear normal.      Nose: Nose normal.      Mouth/Throat:      Mouth: Mucous membranes are moist.      Pharynx: Oropharynx is clear. Eyes:      Extraocular Movements: Extraocular movements intact. Conjunctiva/sclera: Conjunctivae normal.   Cardiovascular:      Rate and Rhythm: Normal rate and regular rhythm. Pulses: Normal pulses. Heart sounds: Normal heart sounds. Pulmonary:      Effort: Pulmonary effort is normal.      Breath sounds: Normal breath sounds. Abdominal:      General: Abdomen is flat. Palpations: There is no mass. Musculoskeletal:      Cervical back: Normal range of motion. Right lower leg: No edema. Left lower leg: No edema. Skin:     General: Skin is warm and dry. Capillary Refill: Capillary refill takes less than 2 seconds. Findings: No erythema, lesion or rash. Neurological:      General: No focal deficit present. Mental Status: She is alert and oriented to person, place, and time. Cranial Nerves: No cranial nerve deficit. Sensory: No sensory deficit. Motor: No weakness. Coordination: Coordination normal.      Gait: Gait normal.   Psychiatric:         Mood and Affect: Mood normal.         Behavior: Behavior normal.         Thought Content: Thought content normal.         Judgment: Judgment normal.          R size  L size   +  Spider  telangiectasias +      Reticular veins     Thigh/calf 8-10 mm Varicose   veins Thigh/calf 8-10 mm       Assessment:      Large symptomatic varicose veins B legs      Plan:      Begin TH/PH 20/30 mmHg wearing daily. F/U with MD after venous reflux scan to discuss test results and treatment options. Explained approach and pt understands and agrees.

## 2021-10-07 NOTE — Clinical Note
Ms. Napoleon Greene saw [facility] Vill[facility] her large symptomatic varicose veins. I have suggest that she begin wearing compression stockings and return to see me after undergoing a venous reflux study to review the results and consider treatment options. I will keep you appraised of our findings and plans for the future. If you have any questions please feel free to contact me. Thanks for asked me to see her and please let me know if I can help you with any other patients in the future.     Trina Triplett

## 2021-10-14 DIAGNOSIS — I83.893 SYMPTOMATIC VARICOSE VEINS OF BOTH LOWER EXTREMITIES: Primary | ICD-10-CM

## 2021-10-19 ENCOUNTER — PROCEDURE VISIT (OUTPATIENT)
Dept: VASCULAR SURGERY | Age: 76
End: 2021-10-19
Payer: MEDICARE

## 2021-10-19 DIAGNOSIS — I83.893 SYMPTOMATIC VARICOSE VEINS OF BOTH LOWER EXTREMITIES: ICD-10-CM

## 2021-10-19 PROCEDURE — 93970 EXTREMITY STUDY: CPT | Performed by: SURGERY

## 2021-10-21 ENCOUNTER — TELEPHONE (OUTPATIENT)
Dept: FAMILY MEDICINE CLINIC | Age: 76
End: 2021-10-21

## 2021-10-21 DIAGNOSIS — Z13.1 DIABETES MELLITUS SCREENING: ICD-10-CM

## 2021-10-21 DIAGNOSIS — Z13.220 LIPID SCREENING: Primary | ICD-10-CM

## 2021-10-25 ENCOUNTER — NURSE ONLY (OUTPATIENT)
Dept: FAMILY MEDICINE CLINIC | Age: 76
End: 2021-10-25
Payer: MEDICARE

## 2021-10-25 DIAGNOSIS — Z13.1 DIABETES MELLITUS SCREENING: ICD-10-CM

## 2021-10-25 DIAGNOSIS — Z13.220 LIPID SCREENING: ICD-10-CM

## 2021-10-25 LAB
A/G RATIO: 1.9 (ref 1.1–2.2)
ALBUMIN SERPL-MCNC: 4.4 G/DL (ref 3.4–5)
ALP BLD-CCNC: 84 U/L (ref 40–129)
ALT SERPL-CCNC: 17 U/L (ref 10–40)
ANION GAP SERPL CALCULATED.3IONS-SCNC: 13 MMOL/L (ref 3–16)
AST SERPL-CCNC: 14 U/L (ref 15–37)
BILIRUB SERPL-MCNC: 0.3 MG/DL (ref 0–1)
BUN BLDV-MCNC: 11 MG/DL (ref 7–20)
CALCIUM SERPL-MCNC: 10.1 MG/DL (ref 8.3–10.6)
CHLORIDE BLD-SCNC: 105 MMOL/L (ref 99–110)
CHOLESTEROL, TOTAL: 150 MG/DL (ref 0–199)
CO2: 25 MMOL/L (ref 21–32)
CREAT SERPL-MCNC: 0.7 MG/DL (ref 0.6–1.2)
GFR AFRICAN AMERICAN: >60
GFR NON-AFRICAN AMERICAN: >60
GLOBULIN: 2.3 G/DL
GLUCOSE BLD-MCNC: 97 MG/DL (ref 70–99)
HDLC SERPL-MCNC: 64 MG/DL (ref 40–60)
LDL CHOLESTEROL CALCULATED: 71 MG/DL
POTASSIUM SERPL-SCNC: 4.9 MMOL/L (ref 3.5–5.1)
SODIUM BLD-SCNC: 143 MMOL/L (ref 136–145)
TOTAL PROTEIN: 6.7 G/DL (ref 6.4–8.2)
TRIGL SERPL-MCNC: 73 MG/DL (ref 0–150)
VLDLC SERPL CALC-MCNC: 15 MG/DL

## 2021-10-25 PROCEDURE — 36415 COLL VENOUS BLD VENIPUNCTURE: CPT | Performed by: NURSE PRACTITIONER

## 2021-11-19 ENCOUNTER — OFFICE VISIT (OUTPATIENT)
Dept: VASCULAR SURGERY | Age: 76
End: 2021-11-19
Payer: MEDICARE

## 2021-11-19 DIAGNOSIS — I83.893 SYMPTOMATIC VARICOSE VEINS OF BOTH LOWER EXTREMITIES: Primary | ICD-10-CM

## 2021-11-19 PROCEDURE — 99213 OFFICE O/P EST LOW 20 MIN: CPT | Performed by: SURGERY

## 2021-11-19 NOTE — PROGRESS NOTES
Seen back for symptomatic varicose veins B legs. Pt has been wearing the prescribed 20/30 mmHg TH stockings with some benefit as less discomfort but some difficulties with slippage. No reported edema, bleeding, tender cord, skin changes or ulceration. Recent venous reflux scan performed on 10/19/2021 at [facility]. EXAM:  No edema, ulceration or dermatitis. All VVs soft, nontender without erythema. VRS - trace B CFV reflux only deep; B GSV reflux with B LSV reflux & L AASV reflux    A/P: Chronic superficial venous insufficiency B legs with secondary symptomatic varicose veins   SIGNIFICANT B AXIAL REFLUX with LARGE VARICOSITIES. Surgery is recommended - B GSV RFA + B LSV RFA + L AASV RFA + B stab phlebectomies. This was discussed in terms that the patient could understand. The risks, including bleeding, clotting, bruising, swelling, neuritis, skin dimpling, infection, pigmentation, scarring, and mortality were discussed. I answered all questions pertaining to surgery and post operative expectations related to return to work and daily activity. Time spent counseling and coordination of care: 25 minutes. More than 50% of visit was spent reviewing and discussing venous duplex scan. She will continue compression stockings (recommended \"It Stays\") and schedule as recommended if desired.

## 2021-11-24 ENCOUNTER — HOSPITAL ENCOUNTER (OUTPATIENT)
Dept: MAMMOGRAPHY | Age: 76
Discharge: HOME | End: 2021-11-24
Payer: MEDICARE

## 2021-11-24 VITALS — BODY MASS INDEX: 24.11 KG/M2 | HEIGHT: 66 IN | WEIGHT: 150 LBS

## 2021-11-24 DIAGNOSIS — Z12.31 ENCOUNTER FOR SCREENING MAMMOGRAM FOR MALIGNANT NEOPLASM OF BREAST: ICD-10-CM

## 2021-11-24 PROCEDURE — 77063 BREAST TOMOSYNTHESIS BI: CPT

## 2021-12-17 ENCOUNTER — TELEPHONE (OUTPATIENT)
Dept: VASCULAR SURGERY | Age: 76
End: 2021-12-17

## 2021-12-29 ENCOUNTER — TELEPHONE (OUTPATIENT)
Dept: VASCULAR SURGERY | Age: 76
End: 2021-12-29

## 2022-01-18 ENCOUNTER — PREP FOR PROCEDURE (OUTPATIENT)
Dept: VASCULAR SURGERY | Age: 77
End: 2022-01-18

## 2022-01-18 DIAGNOSIS — I87.2 CHRONIC VENOUS INSUFFICIENCY: ICD-10-CM

## 2022-01-18 DIAGNOSIS — Z01.818 PREOP TESTING: Primary | ICD-10-CM

## 2022-01-18 NOTE — PROGRESS NOTES
[facility] time___0900_________        Surgery time____1100________    Take the following medications with a sip of water: Follow your MD/Surgeons pre-procedure instructions regarding your medications    Do not eat or drink anything after 12:00 midnight prior to your surgery. This includes water chewing gum, mints and ice chips. You may brush your teeth and gargle the morning of your surgery, but do not swallow the water     Please see your family doctor/pediatrician for a history and physical and/or concerning medications. Bring any test results/reports from your physicians office. If you are under the care of a heart doctor or specialist doctor, please be aware that you may be asked to them for clearance    You may be asked to stop blood thinners such as Coumadin, Plavix, Fragmin, Lovenox, etc., or any anti-inflammatories such as:  Aspirin, Ibuprofen, Advil, Naproxen prior to your surgery. We also ask that you stop any OTC medications such as fish oil, vitamin E, glucosamine, garlic, Multivitamins, COQ 10, etc.    We ask that you do not smoke 24 hours prior to surgery  We ask that you do not  drink any alcoholic beverages 24 hours prior to surgery     You must make arrangements for a responsible adult to take you home after your surgery. For your safety you will not be allowed to leave alone or drive yourself home. Your surgery will be cancelled if you do not have a ride home. Also for your safety, it is strongly suggested that someone stay with you the first 24 hours after your surgery. A parent or legal guardian must accompany a child scheduled for surgery and plan to stay at the hospital until the child is discharged. Please do not bring other children with you. For your comfort, please wear simple loose fitting clothing to the hospital.  Please do not bring valuables.     Do not wear any make-up or nail polish on your fingers or toes      For your safety, please do not wear any jewelry or body piercing's on the day of surgery. All jewelry must be removed. If you have dentures, they will be removed before going to operating room. For your convenience, we will provide you with a container. If you wear contact lenses or glasses, they will be removed, please bring a case for them. If you have a living will and a durable power of  for healthcare, please bring in a copy. As part of our patient safety program to minimize surgical site infections, we ask you to do the following:    · Please notify your surgeon if you develop any illness between         now and the  day of your surgery. · This includes a cough, cold, fever, sore throat, nausea,         or vomiting, and diarrhea, etc.  ·  Please notify your surgeon if you experience dizziness, shortness         of breath or blurred vision between now and the time of your surgery. Do not shave your operative site 96 hours prior to surgery. For face and neck surgery, men may use an electric razor 48 hours   prior to surgery. You may shower the night before surgery or the morning of   your surgery with an antibacterial soap. You will need to bring a photo ID and insurance card    [facility] has an onsite pharmacy, would you like to utilize our pharmacy     If you will be staying overnight and use a C-pap machine, please bring   your C-pap to hospital     Our goal is to provide you with excellent care, therefore, visitors will be limited to two(2) in the room at a time so that we may focus on providing this care for you. Please contact pre-admission testing if you have any further questions. [facility] phone number:  [phone] PAT fax number:  [phone]  Please note these are generalized instructions for all surgical cases, you may be provided with more specific instructions according to your surgery.

## 2022-01-18 NOTE — PROGRESS NOTES
Covid testing to be done @ [facility]--pt stated will make appt  If positive---Pt instructed to notify MD ASAP   If negative--pt was instructed to bring results DOP/DOS

## 2022-01-18 NOTE — PROGRESS NOTES
Pt stated was told to get H&P and blood work done @ PCP--pt was calling to find out what type of blood work needed to be done--No orders noted in epic--call placed to Dr. Llanos Blow office--message left for Jesika Quintero regarding patients concern/question on 1/18/22

## 2022-01-18 NOTE — PROGRESS NOTES
Kaley Carroll : 1945   PHONE NUMBER [phone]    SURGERY ON 22    H&P TO BE DONE ON 22 PER DR. ALCALA(SHOULD BE IN Epic)  COVID TESTING=--PT STATES MAKING APPT WITH Deandre Anderson

## 2022-01-19 ENCOUNTER — OFFICE VISIT (OUTPATIENT)
Dept: FAMILY MEDICINE CLINIC | Age: 77
End: 2022-01-19
Payer: MEDICARE

## 2022-01-19 VITALS
HEART RATE: 86 BPM | BODY MASS INDEX: 23.95 KG/M2 | SYSTOLIC BLOOD PRESSURE: 102 MMHG | WEIGHT: 149 LBS | OXYGEN SATURATION: 98 % | RESPIRATION RATE: 18 BRPM | HEIGHT: 66 IN | DIASTOLIC BLOOD PRESSURE: 68 MMHG | TEMPERATURE: 97.4 F

## 2022-01-19 DIAGNOSIS — Z91.81 AT HIGH RISK FOR FALLS: ICD-10-CM

## 2022-01-19 DIAGNOSIS — Z01.818 PREOP EXAMINATION: ICD-10-CM

## 2022-01-19 DIAGNOSIS — I87.2 CHRONIC VENOUS INSUFFICIENCY: Primary | ICD-10-CM

## 2022-01-19 DIAGNOSIS — R73.01 IFG (IMPAIRED FASTING GLUCOSE): ICD-10-CM

## 2022-01-19 LAB
ANION GAP SERPL CALCULATED.3IONS-SCNC: 14 MMOL/L (ref 3–16)
APTT: 36.9 SEC (ref 26.2–38.6)
BILIRUBIN URINE: NEGATIVE
BLOOD, URINE: ABNORMAL
BUN BLDV-MCNC: 11 MG/DL (ref 7–20)
CALCIUM SERPL-MCNC: 10.1 MG/DL (ref 8.3–10.6)
CHLORIDE BLD-SCNC: 102 MMOL/L (ref 99–110)
CLARITY: CLEAR
CO2: 24 MMOL/L (ref 21–32)
COLOR: YELLOW
CREAT SERPL-MCNC: 0.5 MG/DL (ref 0.6–1.2)
EPITHELIAL CELLS, UA: 1 /HPF (ref 0–5)
GFR AFRICAN AMERICAN: >60
GFR NON-AFRICAN AMERICAN: >60
GLUCOSE BLD-MCNC: 84 MG/DL (ref 70–99)
GLUCOSE URINE: NEGATIVE MG/DL
HCT VFR BLD CALC: 40.3 % (ref 36–48)
HEMOGLOBIN: 13.5 G/DL (ref 12–16)
HYALINE CASTS: 4 /LPF (ref 0–8)
INR BLD: 0.97 (ref 0.88–1.12)
KETONES, URINE: 15 MG/DL
LEUKOCYTE ESTERASE, URINE: NEGATIVE
MCH RBC QN AUTO: 30.2 PG (ref 26–34)
MCHC RBC AUTO-ENTMCNC: 33.4 G/DL (ref 31–36)
MCV RBC AUTO: 90.5 FL (ref 80–100)
MICROSCOPIC EXAMINATION: YES
NITRITE, URINE: NEGATIVE
PDW BLD-RTO: 13.5 % (ref 12.4–15.4)
PH UA: 6.5 (ref 5–8)
PLATELET # BLD: 289 K/UL (ref 135–450)
PMV BLD AUTO: 7.9 FL (ref 5–10.5)
POTASSIUM SERPL-SCNC: 4.3 MMOL/L (ref 3.5–5.1)
PROTEIN UA: NEGATIVE MG/DL
PROTHROMBIN TIME: 10.9 SEC (ref 9.9–12.7)
RBC # BLD: 4.45 M/UL (ref 4–5.2)
RBC UA: 27 /HPF (ref 0–4)
SODIUM BLD-SCNC: 140 MMOL/L (ref 136–145)
SPECIFIC GRAVITY UA: 1.02 (ref 1–1.03)
URINE TYPE: ABNORMAL
UROBILINOGEN, URINE: 0.2 E.U./DL
WBC # BLD: 7.1 K/UL (ref 4–11)
WBC UA: 1 /HPF (ref 0–5)

## 2022-01-19 PROCEDURE — 93000 ELECTROCARDIOGRAM COMPLETE: CPT | Performed by: NURSE PRACTITIONER

## 2022-01-19 PROCEDURE — 81003 URINALYSIS AUTO W/O SCOPE: CPT | Performed by: NURSE PRACTITIONER

## 2022-01-19 PROCEDURE — 36415 COLL VENOUS BLD VENIPUNCTURE: CPT | Performed by: NURSE PRACTITIONER

## 2022-01-19 PROCEDURE — 99214 OFFICE O/P EST MOD 30 MIN: CPT | Performed by: NURSE PRACTITIONER

## 2022-01-19 RX ORDER — MAGNESIUM OXIDE/MAG AA CHELATE 300 MG
CAPSULE ORAL
COMMUNITY

## 2022-01-19 ASSESSMENT — PATIENT HEALTH QUESTIONNAIRE - PHQ9
SUM OF ALL RESPONSES TO PHQ QUESTIONS 1-9: 0
2. FEELING DOWN, DEPRESSED OR HOPELESS: 0
SUM OF ALL RESPONSES TO PHQ QUESTIONS 1-9: 0
SUM OF ALL RESPONSES TO PHQ QUESTIONS 1-9: 0
SUM OF ALL RESPONSES TO PHQ9 QUESTIONS 1 & 2: 0
SUM OF ALL RESPONSES TO PHQ QUESTIONS 1-9: 0
1. LITTLE INTEREST OR PLEASURE IN DOING THINGS: 0

## 2022-01-19 NOTE — PROGRESS NOTES
Preoperative Consultation      Soni Lowe  YOB: 1945    Date of Service:  1/19/2022    Vitals:    01/19/22 1423   BP: 102/68   Site: Left Upper Arm   Position: Sitting   Cuff Size: Medium Adult   Pulse: 86   Resp: 18   Temp: 97.4 °F (36.3 °C)   TempSrc: Temporal   SpO2: 98%   Weight: 149 lb (67.6 kg)   Height: 5' 6\" (1.676 m)      Wt Readings from Last 2 Encounters:   01/18/22 150 lb (68 kg)   01/19/22 149 lb (67.6 kg)     BP Readings from Last 3 Encounters:   01/19/22 102/68   09/02/21 130/64        Chief Complaint   Patient presents with    Pre-op Exam     PRE OP EXAM, NEEDS BW AND EKG, DR. Yaritza Tran, BILATERAL ENDOVENOUS RADIOFREQUENCY ABLATION OF GREATER SAPHENOUS VEIN, BILATERAL ENDOVENOUS RADIOFREQUENCY ABLATION OF LESSER SAPHENOUS VEIN, LEFT ENDOVENOUS RADIOFREQUENCY ABLATION OF ANTERIOR ACCESSORY SAPHENOUS VEIN, BILATERAL STAB PHLEBECTOMIES     No Known Allergies  Outpatient Medications Marked as Taking for the 1/19/22 encounter (Office Visit) with Maynor Wilkes, ANAY - CNP   Medication Sig Dispense Refill    vitamin D (CHOLECALCIFEROL) 25 MCG (1000 UT) TABS tablet Take 2,000 Units by mouth daily      Magnesium 300 MG CAPS Take by mouth      Misc Natural Products (JOINT HEALTH) CAPS Take by mouth      Multiple Vitamin (MULTIVITAMIN) tablet Take 1 tablet by mouth daily      BIOTIN PO Take 1 tablet by mouth daily      Nutritional Supplements (MENOPAUSE FORMULA PO) Take 1 tablet by mouth daily      b complex vitamins capsule Take 2 capsules by mouth daily       Multiple Vitamins-Minerals (EYE VITAMINS PO) Take 1 tablet by mouth daily         This patient presents to the office today for a preoperative consultation at the request of surgeon, Dr. Benita Silverman, who plans on performing BILATERAL ENDOVENOUS [address] [place], LEFT ENDOVENOUS [address] SAPHENOUS VEIN, BILATERAL STAB PHLEBECTOMIES on January 25 at [address].  The current problem began multiple years ago, and symptoms have been worsening with time. Conservative therapy: Yes: compression stockings, elevation, which has been not very effective.     Planned anesthesia: General   Known anesthesia problems: None   Bleeding risk: No recent or remote history of abnormal bleeding  Personal or FH of DVT/PE: No    Patient objection to receiving blood products: No    Patient Active Problem List   Diagnosis   (none) - all problems resolved or deleted       Past Medical History:   Diagnosis Date    Arthritis     Varicose vein of leg     Wears glasses      Past Surgical History:   Procedure Laterality Date    BREAST BIOPSY      BREAST LUMPECTOMY Right     2004    ELBOW SURGERY Left     plate--fracture    PARATHYROID GLAND SURGERY  2008     Family History   Problem Relation Age of Onset    No Known Problems Mother     Parkinson's Disease Father     Diabetes Father      Social History     Socioeconomic History    Marital status: Single     Spouse name: Not on file    Number of children: Not on file    Years of education: Not on file    Highest education level: Not on file   Occupational History    Not on file   Tobacco Use    Smoking status: Never Smoker    Smokeless tobacco: Never Used   Vaping Use    Vaping Use: Never used   Substance and Sexual Activity    Alcohol use: Never    Drug use: Never    Sexual activity: Not Currently   Other Topics Concern    Not on file   Social History Narrative    Not on file     Social Determinants of Health     Financial Resource Strain: Low Risk     Difficulty of Paying Living Expenses: Not hard at all   Food Insecurity: No Food Insecurity    Worried About Running Out of Food in the Last Year: Never true    [address] in the Last Year: Never true   Transportation Needs: No Transportation Needs    Lack of Transportation (Medical): No    Lack of Transportation (Non-Medical): No   Physical Activity:     Days of Exercise per Week: Not on file    Minutes of Exercise per Session: Not on file   Stress:     Feeling of Stress : Not on file   Social Connections:     Frequency of Communication with Friends and Family: Not on file    Frequency of Social Gatherings with Friends and Family: Not on file    Attends [employer] Services: Not on file    Active Member of [address] [place] [address] or Organizations: Not on file    Attends Club or Organization Meetings: Not on file    Marital Status: Not on file   Intimate Partner Violence:     Fear of Current or Ex-Partner: Not on file    Emotionally Abused: Not on file    Physically Abused: Not on file    Sexually Abused: Not on file   Housing Stability:     Unable to Pay for Housing in the Last Year: Not on file    Number of [place] in the Last Year: Not on file    Unstable Housing in the Last Year: Not on file       Review of Systems  A comprehensive review of systems was negative except for what was noted in the HPI. Physical Exam   Constitutional: She is oriented to person, place, and time. She appears well-developed and well-nourished. No distress. HENT:   Head: Normocephalic and atraumatic. Mouth/Throat: Uvula is midline, oropharynx is clear and moist and mucous membranes are normal.   Eyes: Conjunctivae and EOM are normal. Pupils are equal, round, and reactive to light. Neck: Trachea normal and normal range of motion. Neck supple. No JVD present. Carotid bruit is not present. No mass and no thyromegaly present. Cardiovascular: Normal rate, regular rhythm, normal heart sounds and intact distal pulses. Exam reveals no gallop and no friction rub. No murmur heard. Pulmonary/Chest: Effort normal and breath sounds normal. No respiratory distress. She has no wheezes. She has no rales. Abdominal: Soft. Normal aorta and bowel sounds are normal. She exhibits no distension and no mass.  There is no hepatosplenomegaly. No tenderness. Musculoskeletal: She exhibits no edema and no tenderness. Neurological: She is alert and oriented to person, place, and time. She has normal strength. No cranial nerve deficit or sensory deficit. Coordination and gait normal.   Skin: Skin is warm and dry. No rash noted. No erythema. Psychiatric: She has a normal mood and affect. Her behavior is normal.     EKG Interpretation:  normal sinus rhythm, Short [place] syndrome. There are no available tracings for comparison. Lab Review Performing requested labs today        Assessment:       77 y.o. patient with planned surgery as above. Known risk factors for perioperative complications: None  Current medications which may produce withdrawal symptoms if withheld perioperatively: none      Plan:   1. Chronic venous insufficiency  - Cleared for surgery  - EKG 12 Lead  - URINALYSIS  - Protime-INR; Future  - CBC; Future  - Basic Metabolic Panel; Future  - APTT; Future    2. Preop examination  1. Preoperative workup as follows: ECG, hemoglobin, hematocrit, electrolytes, creatinine, glucose, coagulation studies, urinalysis (urinary tract instrumentation planned). COVID-19 ordered by Dr. Brittney Goodwin. Educated patient on [facility] COVID-19 drawsite. Patient would prefer to go to [facility] and bring the result the day of surgery. States this was an acceptable alternative by the surgical team.  2. Change in medication regimen before surgery: Discontinue NSAIDs (ibuprofen/aleve) 7 days before surgery, Discontinue vitamins and supplements 7 days before surgery  3.  Prophylaxis for cardiac events with perioperative beta-blockers: Not indicated  ACC/AHA indications for pre-operative beta-blocker use:    · Vascular surgery with history of postitive stress test  · Intermediate or high risk surgery with history of CAD   · Intermediate or high risk surgery with multiple clinical predictors of CAD- 2 of the following: history of compensated or prior heart failure, history of cerebrovascular disease, DM, or renal insufficiency    Routine administration of higher-dose, long-acting metoprolol in beta-blocker-naïve patients on the day of surgery, and in the absence of dose titration is associated with an overall increase in mortality. Beta-blockers should be started days to weeks prior to surgery and titrated to pulse < 70.  4. Deep vein thrombosis prophylaxis: regimen to be chosen by surgical team  5. No contraindications to planned surgery  - EKG 12 Lead  - URINALYSIS  - Protime-INR; Future  - CBC; Future  - Basic Metabolic Panel; Future  - APTT; Future    3. At high risk for falls  -On the basis of positive falls risk screening, assessment and plan is as follows: home safety tips provided. This dictation was generated by voice recognition computer software. Although all attempts are made to edit the dictation for accuracy, there may be errors in the transcription that are not intended. An electronic signature was used to authenticate this note.     --Cassius Babin, APRLISS - CNP

## 2022-01-19 NOTE — PATIENT INSTRUCTIONS
GENERAL OFFICE POLICIES      Telephone Calls: Messages will be answered within 1-2 business days, unless the provider is out of the office. If it is urgent a covering provider will answer. (this does not include Medication refills). MyChart: We recommend all patients sign up for [employer]. Through this portal you can see your lab results, request refills, schedule appointments, pay your bill and send messages to the office. [employer] messages will be answered within 1-2 business days unless the provider is out of the office. For urgent matters, please call the office. Appointments:  All appointments must be scheduled. We ask all patients to schedule their next follow up appointment before they leave the office to make sure you will be able to be seen before you run out of medications. 24 hours notice is required to cancel or reschedule an appointment to avoid being marked as a no show. You may be dismissed from the practice after 3 no shows. LATE for Appointment: If you are 15 or more minutes late for your appointment, you may be asked to reschedule. MA/LAB APPTS: Must be scheduled, cannot accept walk in lab visits. We only draw labs for patients established in our office. We only do injections for medications ordered by our office. Acute Sick Visits:  Nothing other than acute complaint will be addressed at this visit. TRADITIONAL MEDICARE  DOES NOT COVER PHYSICALS  MEDICARE WELLNESS VISITS: These are NOT physicals but the free annual visit offered by Medicare to discuss wellness issues. Medication refills, checkups, etc. will not be addressed during this visit. Medication Refills: Refills are handled electronically so please contact your pharmacy for medication refills even if current refills have been exhausted. If you are on a controlled medication you will be referred to a specialist (pain specialist, psychiatry, etc). Forms:  There is a $35 fee to fill out FMLA/Disability paperwork, payable at time of . Instead of the fee, you can choose to have the paperwork filled out during a separate office visit that is for filling out the paperwork only. Medication Samples: This office does not carry medication samples. If you need assistance in getting your medications, then please let the medical assistant know so they can help you sign up for a drug assistance program that can help get medications at a reduced cost or even free (if you qualify). Workman's Comp Claims: We do not handle workman's comp cases or claims. You will need to go to an urgent care to be seen or to whomever your employer uses.   General - Any abusive/rude behavior toward staff/providers may be cause for dismissal.

## 2022-01-20 LAB
ESTIMATED AVERAGE GLUCOSE: 111.2 MG/DL
HBA1C MFR BLD: 5.5 %

## 2022-01-24 ENCOUNTER — ANESTHESIA EVENT (OUTPATIENT)
Dept: OPERATING ROOM | Age: 77
End: 2022-01-24
Payer: MEDICARE

## 2022-01-25 ENCOUNTER — ANESTHESIA (OUTPATIENT)
Dept: OPERATING ROOM | Age: 77
End: 2022-01-25
Payer: MEDICARE

## 2022-01-25 ENCOUNTER — HOSPITAL ENCOUNTER (OUTPATIENT)
Age: 77
Setting detail: OUTPATIENT SURGERY
Discharge: HOME | End: 2022-01-25
Attending: SURGERY | Admitting: SURGERY
Payer: MEDICARE

## 2022-01-25 VITALS
BODY MASS INDEX: 23.35 KG/M2 | DIASTOLIC BLOOD PRESSURE: 58 MMHG | HEIGHT: 66 IN | OXYGEN SATURATION: 99 % | WEIGHT: 145.28 LBS | RESPIRATION RATE: 14 BRPM | TEMPERATURE: 97 F | HEART RATE: 68 BPM | SYSTOLIC BLOOD PRESSURE: 140 MMHG

## 2022-01-25 VITALS
SYSTOLIC BLOOD PRESSURE: 123 MMHG | RESPIRATION RATE: 3 BRPM | TEMPERATURE: 95 F | OXYGEN SATURATION: 100 % | DIASTOLIC BLOOD PRESSURE: 61 MMHG

## 2022-01-25 DIAGNOSIS — M79.609 POSTOPERATIVE PAIN OF EXTREMITY: Primary | ICD-10-CM

## 2022-01-25 DIAGNOSIS — G89.18 POSTOPERATIVE PAIN OF EXTREMITY: Primary | ICD-10-CM

## 2022-01-25 PROCEDURE — 6360000002 HC RX W HCPCS: Performed by: NURSE ANESTHETIST, CERTIFIED REGISTERED

## 2022-01-25 PROCEDURE — A4217 STERILE WATER/SALINE, 500 ML: HCPCS | Performed by: SURGERY

## 2022-01-25 PROCEDURE — 36475 ENDOVENOUS RF 1ST VEIN: CPT | Performed by: SURGERY

## 2022-01-25 PROCEDURE — 2580000003 HC RX 258: Performed by: NURSE ANESTHETIST, CERTIFIED REGISTERED

## 2022-01-25 PROCEDURE — 7100000000 HC PACU RECOVERY - FIRST 15 MIN: Performed by: SURGERY

## 2022-01-25 PROCEDURE — 3600000014 HC SURGERY LEVEL 4 ADDTL 15MIN: Performed by: SURGERY

## 2022-01-25 PROCEDURE — 7100000010 HC PHASE II RECOVERY - FIRST 15 MIN: Performed by: SURGERY

## 2022-01-25 PROCEDURE — 3600000004 HC SURGERY LEVEL 4 BASE: Performed by: SURGERY

## 2022-01-25 PROCEDURE — 7100000001 HC PACU RECOVERY - ADDTL 15 MIN: Performed by: SURGERY

## 2022-01-25 PROCEDURE — 2709999900 HC NON-CHARGEABLE SUPPLY: Performed by: SURGERY

## 2022-01-25 PROCEDURE — 7100000011 HC PHASE II RECOVERY - ADDTL 15 MIN: Performed by: SURGERY

## 2022-01-25 PROCEDURE — 37766 PHLEB VEINS - EXTREM 20+: CPT | Performed by: SURGERY

## 2022-01-25 PROCEDURE — 2500000003 HC RX 250 WO HCPCS: Performed by: SURGERY

## 2022-01-25 PROCEDURE — 3700000001 HC ADD 15 MINUTES (ANESTHESIA): Performed by: SURGERY

## 2022-01-25 PROCEDURE — 2500000003 HC RX 250 WO HCPCS: Performed by: NURSE ANESTHETIST, CERTIFIED REGISTERED

## 2022-01-25 PROCEDURE — C1769 GUIDE WIRE: HCPCS | Performed by: SURGERY

## 2022-01-25 PROCEDURE — 3700000000 HC ANESTHESIA ATTENDED CARE: Performed by: SURGERY

## 2022-01-25 PROCEDURE — 2580000003 HC RX 258: Performed by: ANESTHESIOLOGY

## 2022-01-25 PROCEDURE — 2580000003 HC RX 258: Performed by: SURGERY

## 2022-01-25 PROCEDURE — C1894 INTRO/SHEATH, NON-LASER: HCPCS | Performed by: SURGERY

## 2022-01-25 PROCEDURE — C1888 ENDOVAS NON-CARDIAC ABL CATH: HCPCS | Performed by: SURGERY

## 2022-01-25 PROCEDURE — 36476 ENDOVENOUS RF VEIN ADD-ON: CPT | Performed by: SURGERY

## 2022-01-25 RX ORDER — SODIUM CHLORIDE 9 MG/ML
INJECTION, SOLUTION INTRAVENOUS CONTINUOUS
Status: DISCONTINUED | OUTPATIENT
Start: 2022-01-25 | End: 2022-01-25 | Stop reason: HOSPADM

## 2022-01-25 RX ORDER — LIDOCAINE HYDROCHLORIDE 10 MG/ML
INJECTION, SOLUTION INFILTRATION; PERINEURAL
Status: COMPLETED | OUTPATIENT
Start: 2022-01-25 | End: 2022-01-25

## 2022-01-25 RX ORDER — PROPOFOL 10 MG/ML
INJECTION, EMULSION INTRAVENOUS PRN
Status: DISCONTINUED | OUTPATIENT
Start: 2022-01-25 | End: 2022-01-25 | Stop reason: SDUPTHER

## 2022-01-25 RX ORDER — SODIUM CHLORIDE 0.9 % (FLUSH) 0.9 %
5-40 SYRINGE (ML) INJECTION EVERY 12 HOURS SCHEDULED
Status: CANCELLED | OUTPATIENT
Start: 2022-01-25

## 2022-01-25 RX ORDER — HYDROCODONE BITARTRATE AND ACETAMINOPHEN 5; 325 MG/1; MG/1
1 TABLET ORAL PRN
Status: DISCONTINUED | OUTPATIENT
Start: 2022-01-25 | End: 2022-01-25 | Stop reason: HOSPADM

## 2022-01-25 RX ORDER — MEPERIDINE HYDROCHLORIDE 25 MG/ML
12.5 INJECTION INTRAMUSCULAR; INTRAVENOUS; SUBCUTANEOUS
Status: DISCONTINUED | OUTPATIENT
Start: 2022-01-25 | End: 2022-01-25 | Stop reason: HOSPADM

## 2022-01-25 RX ORDER — MAGNESIUM HYDROXIDE 1200 MG/15ML
LIQUID ORAL CONTINUOUS PRN
Status: COMPLETED | OUTPATIENT
Start: 2022-01-25 | End: 2022-01-25

## 2022-01-25 RX ORDER — MIDAZOLAM HYDROCHLORIDE 1 MG/ML
INJECTION INTRAMUSCULAR; INTRAVENOUS PRN
Status: DISCONTINUED | OUTPATIENT
Start: 2022-01-25 | End: 2022-01-25 | Stop reason: SDUPTHER

## 2022-01-25 RX ORDER — ROCURONIUM BROMIDE 10 MG/ML
INJECTION, SOLUTION INTRAVENOUS PRN
Status: DISCONTINUED | OUTPATIENT
Start: 2022-01-25 | End: 2022-01-25 | Stop reason: SDUPTHER

## 2022-01-25 RX ORDER — SODIUM CHLORIDE 0.9 % (FLUSH) 0.9 %
5-40 SYRINGE (ML) INJECTION EVERY 12 HOURS SCHEDULED
Status: DISCONTINUED | OUTPATIENT
Start: 2022-01-25 | End: 2022-01-25 | Stop reason: HOSPADM

## 2022-01-25 RX ORDER — LIDOCAINE HYDROCHLORIDE 20 MG/ML
INJECTION, SOLUTION EPIDURAL; INFILTRATION; INTRACAUDAL; PERINEURAL PRN
Status: DISCONTINUED | OUTPATIENT
Start: 2022-01-25 | End: 2022-01-25 | Stop reason: SDUPTHER

## 2022-01-25 RX ORDER — FENTANYL CITRATE 50 UG/ML
INJECTION, SOLUTION INTRAMUSCULAR; INTRAVENOUS PRN
Status: DISCONTINUED | OUTPATIENT
Start: 2022-01-25 | End: 2022-01-25 | Stop reason: SDUPTHER

## 2022-01-25 RX ORDER — SODIUM CHLORIDE 9 MG/ML
25 INJECTION, SOLUTION INTRAVENOUS PRN
Status: DISCONTINUED | OUTPATIENT
Start: 2022-01-25 | End: 2022-01-25 | Stop reason: HOSPADM

## 2022-01-25 RX ORDER — SODIUM CHLORIDE 0.9 % (FLUSH) 0.9 %
5-40 SYRINGE (ML) INJECTION PRN
Status: DISCONTINUED | OUTPATIENT
Start: 2022-01-25 | End: 2022-01-25 | Stop reason: HOSPADM

## 2022-01-25 RX ORDER — HYDROCODONE BITARTRATE AND ACETAMINOPHEN 5; 325 MG/1; MG/1
1 TABLET ORAL EVERY 6 HOURS PRN
Qty: 20 TABLET | Refills: 0 | Status: SHIPPED | OUTPATIENT
Start: 2022-01-25 | End: 2022-02-08

## 2022-01-25 RX ORDER — FENTANYL CITRATE 50 UG/ML
25 INJECTION, SOLUTION INTRAMUSCULAR; INTRAVENOUS EVERY 5 MIN PRN
Status: DISCONTINUED | OUTPATIENT
Start: 2022-01-25 | End: 2022-01-25 | Stop reason: HOSPADM

## 2022-01-25 RX ORDER — SODIUM CHLORIDE 9 MG/ML
25 INJECTION, SOLUTION INTRAVENOUS PRN
Status: CANCELLED | OUTPATIENT
Start: 2022-01-25

## 2022-01-25 RX ORDER — HYDRALAZINE HYDROCHLORIDE 20 MG/ML
5 INJECTION INTRAMUSCULAR; INTRAVENOUS EVERY 10 MIN PRN
Status: DISCONTINUED | OUTPATIENT
Start: 2022-01-25 | End: 2022-01-25 | Stop reason: HOSPADM

## 2022-01-25 RX ORDER — HYDROCODONE BITARTRATE AND ACETAMINOPHEN 5; 325 MG/1; MG/1
2 TABLET ORAL PRN
Status: DISCONTINUED | OUTPATIENT
Start: 2022-01-25 | End: 2022-01-25 | Stop reason: HOSPADM

## 2022-01-25 RX ORDER — PHENYLEPHRINE HCL IN 0.9% NACL 1 MG/10 ML
SYRINGE (ML) INTRAVENOUS PRN
Status: DISCONTINUED | OUTPATIENT
Start: 2022-01-25 | End: 2022-01-25 | Stop reason: SDUPTHER

## 2022-01-25 RX ORDER — GLYCOPYRROLATE 0.2 MG/ML
INJECTION INTRAMUSCULAR; INTRAVENOUS PRN
Status: DISCONTINUED | OUTPATIENT
Start: 2022-01-25 | End: 2022-01-25 | Stop reason: SDUPTHER

## 2022-01-25 RX ORDER — METOPROLOL TARTRATE 5 MG/5ML
INJECTION INTRAVENOUS PRN
Status: DISCONTINUED | OUTPATIENT
Start: 2022-01-25 | End: 2022-01-25 | Stop reason: SDUPTHER

## 2022-01-25 RX ORDER — DEXAMETHASONE SODIUM PHOSPHATE 4 MG/ML
INJECTION, SOLUTION INTRA-ARTICULAR; INTRALESIONAL; INTRAMUSCULAR; INTRAVENOUS; SOFT TISSUE PRN
Status: DISCONTINUED | OUTPATIENT
Start: 2022-01-25 | End: 2022-01-25 | Stop reason: SDUPTHER

## 2022-01-25 RX ORDER — FENTANYL CITRATE 50 UG/ML
50 INJECTION, SOLUTION INTRAMUSCULAR; INTRAVENOUS EVERY 5 MIN PRN
Status: DISCONTINUED | OUTPATIENT
Start: 2022-01-25 | End: 2022-01-25 | Stop reason: HOSPADM

## 2022-01-25 RX ORDER — ONDANSETRON 2 MG/ML
INJECTION INTRAMUSCULAR; INTRAVENOUS PRN
Status: DISCONTINUED | OUTPATIENT
Start: 2022-01-25 | End: 2022-01-25 | Stop reason: SDUPTHER

## 2022-01-25 RX ORDER — CEFAZOLIN SODIUM 1 G/3ML
INJECTION, POWDER, FOR SOLUTION INTRAMUSCULAR; INTRAVENOUS PRN
Status: DISCONTINUED | OUTPATIENT
Start: 2022-01-25 | End: 2022-01-25 | Stop reason: SDUPTHER

## 2022-01-25 RX ORDER — SODIUM CHLORIDE 9 MG/ML
INJECTION, SOLUTION INTRAVENOUS CONTINUOUS PRN
Status: DISCONTINUED | OUTPATIENT
Start: 2022-01-25 | End: 2022-01-25 | Stop reason: SDUPTHER

## 2022-01-25 RX ORDER — PROMETHAZINE HYDROCHLORIDE 25 MG/ML
6.25 INJECTION, SOLUTION INTRAMUSCULAR; INTRAVENOUS
Status: DISCONTINUED | OUTPATIENT
Start: 2022-01-25 | End: 2022-01-25 | Stop reason: HOSPADM

## 2022-01-25 RX ORDER — SODIUM CHLORIDE 0.9 % (FLUSH) 0.9 %
5-40 SYRINGE (ML) INJECTION PRN
Status: CANCELLED | OUTPATIENT
Start: 2022-01-25

## 2022-01-25 RX ORDER — ONDANSETRON 2 MG/ML
4 INJECTION INTRAMUSCULAR; INTRAVENOUS
Status: DISCONTINUED | OUTPATIENT
Start: 2022-01-25 | End: 2022-01-25 | Stop reason: HOSPADM

## 2022-01-25 RX ADMIN — ROCURONIUM BROMIDE 10 MG: 10 SOLUTION INTRAVENOUS at 13:57

## 2022-01-25 RX ADMIN — SODIUM CHLORIDE: 9 INJECTION, SOLUTION INTRAVENOUS at 15:01

## 2022-01-25 RX ADMIN — CEFAZOLIN SODIUM 2000 MG: 1 INJECTION, POWDER, FOR SOLUTION INTRAMUSCULAR; INTRAVENOUS at 15:58

## 2022-01-25 RX ADMIN — HYDROMORPHONE HYDROCHLORIDE 0.25 MG: 1 INJECTION, SOLUTION INTRAMUSCULAR; INTRAVENOUS; SUBCUTANEOUS at 16:03

## 2022-01-25 RX ADMIN — PROPOFOL 150 MG: 10 INJECTION, EMULSION INTRAVENOUS at 12:10

## 2022-01-25 RX ADMIN — GLYCOPYRROLATE 0.1 MG: 0.2 INJECTION, SOLUTION INTRAMUSCULAR; INTRAVENOUS at 12:07

## 2022-01-25 RX ADMIN — FENTANYL CITRATE 50 MCG: 50 INJECTION INTRAMUSCULAR; INTRAVENOUS at 12:10

## 2022-01-25 RX ADMIN — ROCURONIUM BROMIDE 40 MG: 10 SOLUTION INTRAVENOUS at 12:10

## 2022-01-25 RX ADMIN — FENTANYL CITRATE 50 MCG: 50 INJECTION INTRAMUSCULAR; INTRAVENOUS at 13:29

## 2022-01-25 RX ADMIN — MIDAZOLAM 1 MG: 1 INJECTION INTRAMUSCULAR; INTRAVENOUS at 12:07

## 2022-01-25 RX ADMIN — LIDOCAINE HYDROCHLORIDE 80 MG: 20 INJECTION, SOLUTION EPIDURAL; INFILTRATION; INTRACAUDAL; PERINEURAL at 12:10

## 2022-01-25 RX ADMIN — DEXAMETHASONE SODIUM PHOSPHATE 8 MG: 4 INJECTION, SOLUTION INTRA-ARTICULAR; INTRALESIONAL; INTRAMUSCULAR; INTRAVENOUS; SOFT TISSUE at 12:15

## 2022-01-25 RX ADMIN — HYDROMORPHONE HYDROCHLORIDE 0.25 MG: 1 INJECTION, SOLUTION INTRAMUSCULAR; INTRAVENOUS; SUBCUTANEOUS at 16:17

## 2022-01-25 RX ADMIN — SODIUM CHLORIDE: 9 INJECTION, SOLUTION INTRAVENOUS at 09:34

## 2022-01-25 RX ADMIN — HYDROMORPHONE HYDROCHLORIDE 0.25 MG: 1 INJECTION, SOLUTION INTRAMUSCULAR; INTRAVENOUS; SUBCUTANEOUS at 15:00

## 2022-01-25 RX ADMIN — Medication 100 MCG: at 12:47

## 2022-01-25 RX ADMIN — ONDANSETRON 4 MG: 2 INJECTION INTRAMUSCULAR; INTRAVENOUS at 13:59

## 2022-01-25 RX ADMIN — METOPROLOL TARTRATE 0.5 MG: 1 INJECTION, SOLUTION INTRAVENOUS at 12:29

## 2022-01-25 RX ADMIN — MIDAZOLAM 1 MG: 1 INJECTION INTRAMUSCULAR; INTRAVENOUS at 12:10

## 2022-01-25 RX ADMIN — SODIUM CHLORIDE: 9 INJECTION, SOLUTION INTRAVENOUS at 12:07

## 2022-01-25 RX ADMIN — CEFAZOLIN SODIUM 2000 MG: 1 INJECTION, POWDER, FOR SOLUTION INTRAMUSCULAR; INTRAVENOUS at 12:07

## 2022-01-25 RX ADMIN — HYDROMORPHONE HYDROCHLORIDE 0.25 MG: 1 INJECTION, SOLUTION INTRAMUSCULAR; INTRAVENOUS; SUBCUTANEOUS at 16:07

## 2022-01-25 RX ADMIN — METOPROLOL TARTRATE 0.5 MG: 1 INJECTION, SOLUTION INTRAVENOUS at 12:31

## 2022-01-25 ASSESSMENT — PULMONARY FUNCTION TESTS
PIF_VALUE: 17
PIF_VALUE: 19
PIF_VALUE: 20
PIF_VALUE: 16
PIF_VALUE: 20
PIF_VALUE: 21
PIF_VALUE: 18
PIF_VALUE: 21
PIF_VALUE: 20
PIF_VALUE: 2
PIF_VALUE: 17
PIF_VALUE: 17
PIF_VALUE: 18
PIF_VALUE: 19
PIF_VALUE: 16
PIF_VALUE: 21
PIF_VALUE: 18
PIF_VALUE: 2
PIF_VALUE: 19
PIF_VALUE: 18
PIF_VALUE: 3
PIF_VALUE: 17
PIF_VALUE: 18
PIF_VALUE: 19
PIF_VALUE: 16
PIF_VALUE: 17
PIF_VALUE: 17
PIF_VALUE: 19
PIF_VALUE: 21
PIF_VALUE: 20
PIF_VALUE: 17
PIF_VALUE: 17
PIF_VALUE: 16
PIF_VALUE: 19
PIF_VALUE: 16
PIF_VALUE: 21
PIF_VALUE: 16
PIF_VALUE: 19
PIF_VALUE: 17
PIF_VALUE: 20
PIF_VALUE: 18
PIF_VALUE: 19
PIF_VALUE: 17
PIF_VALUE: 18
PIF_VALUE: 6
PIF_VALUE: 19
PIF_VALUE: 20
PIF_VALUE: 17
PIF_VALUE: 18
PIF_VALUE: 21
PIF_VALUE: 21
PIF_VALUE: 17
PIF_VALUE: 5
PIF_VALUE: 21
PIF_VALUE: 0
PIF_VALUE: 16
PIF_VALUE: 19
PIF_VALUE: 21
PIF_VALUE: 19
PIF_VALUE: 17
PIF_VALUE: 21
PIF_VALUE: 20
PIF_VALUE: 2
PIF_VALUE: 17
PIF_VALUE: 18
PIF_VALUE: 20
PIF_VALUE: 17
PIF_VALUE: 19
PIF_VALUE: 22
PIF_VALUE: 17
PIF_VALUE: 19
PIF_VALUE: 0
PIF_VALUE: 19
PIF_VALUE: 6
PIF_VALUE: 19
PIF_VALUE: 17
PIF_VALUE: 2
PIF_VALUE: 19
PIF_VALUE: 18
PIF_VALUE: 21
PIF_VALUE: 17
PIF_VALUE: 18
PIF_VALUE: 20
PIF_VALUE: 20
PIF_VALUE: 21
PIF_VALUE: 21
PIF_VALUE: 18
PIF_VALUE: 16
PIF_VALUE: 16
PIF_VALUE: 17
PIF_VALUE: 19
PIF_VALUE: 21
PIF_VALUE: 16
PIF_VALUE: 21
PIF_VALUE: 16
PIF_VALUE: 20
PIF_VALUE: 17
PIF_VALUE: 19
PIF_VALUE: 20
PIF_VALUE: 17
PIF_VALUE: 18
PIF_VALUE: 19
PIF_VALUE: 20
PIF_VALUE: 16
PIF_VALUE: 20
PIF_VALUE: 19
PIF_VALUE: 20
PIF_VALUE: 18
PIF_VALUE: 17
PIF_VALUE: 20
PIF_VALUE: 18
PIF_VALUE: 2
PIF_VALUE: 19
PIF_VALUE: 17
PIF_VALUE: 20
PIF_VALUE: 8
PIF_VALUE: 20
PIF_VALUE: 18
PIF_VALUE: 3
PIF_VALUE: 18
PIF_VALUE: 15
PIF_VALUE: 19
PIF_VALUE: 2
PIF_VALUE: 19
PIF_VALUE: 16
PIF_VALUE: 18
PIF_VALUE: 19
PIF_VALUE: 17
PIF_VALUE: 19
PIF_VALUE: 20
PIF_VALUE: 19
PIF_VALUE: 3
PIF_VALUE: 18
PIF_VALUE: 17
PIF_VALUE: 17
PIF_VALUE: 19
PIF_VALUE: 19
PIF_VALUE: 21
PIF_VALUE: 18
PIF_VALUE: 21
PIF_VALUE: 19
PIF_VALUE: 18
PIF_VALUE: 19
PIF_VALUE: 3
PIF_VALUE: 2
PIF_VALUE: 17
PIF_VALUE: 16
PIF_VALUE: 21
PIF_VALUE: 19
PIF_VALUE: 3
PIF_VALUE: 21
PIF_VALUE: 19
PIF_VALUE: 19
PIF_VALUE: 21
PIF_VALUE: 3
PIF_VALUE: 17
PIF_VALUE: 17
PIF_VALUE: 21
PIF_VALUE: 16
PIF_VALUE: 18
PIF_VALUE: 21
PIF_VALUE: 17
PIF_VALUE: 18
PIF_VALUE: 17
PIF_VALUE: 19
PIF_VALUE: 19
PIF_VALUE: 21
PIF_VALUE: 17
PIF_VALUE: 17
PIF_VALUE: 18
PIF_VALUE: 19
PIF_VALUE: 21
PIF_VALUE: 19
PIF_VALUE: 18
PIF_VALUE: 19
PIF_VALUE: 17
PIF_VALUE: 18
PIF_VALUE: 3
PIF_VALUE: 20
PIF_VALUE: 21
PIF_VALUE: 2
PIF_VALUE: 19
PIF_VALUE: 19
PIF_VALUE: 17
PIF_VALUE: 17
PIF_VALUE: 16
PIF_VALUE: 17
PIF_VALUE: 19
PIF_VALUE: 17
PIF_VALUE: 19
PIF_VALUE: 17
PIF_VALUE: 19
PIF_VALUE: 18
PIF_VALUE: 18
PIF_VALUE: 21
PIF_VALUE: 18
PIF_VALUE: 18
PIF_VALUE: 17
PIF_VALUE: 20
PIF_VALUE: 3
PIF_VALUE: 21
PIF_VALUE: 19
PIF_VALUE: 19
PIF_VALUE: 18
PIF_VALUE: 19
PIF_VALUE: 19
PIF_VALUE: 0
PIF_VALUE: 19
PIF_VALUE: 19
PIF_VALUE: 18
PIF_VALUE: 20
PIF_VALUE: 21
PIF_VALUE: 21
PIF_VALUE: 2
PIF_VALUE: 2
PIF_VALUE: 18
PIF_VALUE: 18
PIF_VALUE: 20
PIF_VALUE: 3
PIF_VALUE: 19
PIF_VALUE: 16
PIF_VALUE: 20
PIF_VALUE: 19
PIF_VALUE: 3
PIF_VALUE: 21
PIF_VALUE: 3
PIF_VALUE: 18
PIF_VALUE: 21
PIF_VALUE: 18
PIF_VALUE: 9
PIF_VALUE: 17
PIF_VALUE: 9
PIF_VALUE: 19
PIF_VALUE: 18
PIF_VALUE: 17
PIF_VALUE: 21
PIF_VALUE: 17
PIF_VALUE: 19
PIF_VALUE: 19
PIF_VALUE: 22
PIF_VALUE: 17
PIF_VALUE: 20
PIF_VALUE: 17
PIF_VALUE: 19
PIF_VALUE: 19
PIF_VALUE: 21
PIF_VALUE: 19
PIF_VALUE: 16

## 2022-01-25 ASSESSMENT — PAIN SCALES - GENERAL
PAINLEVEL_OUTOF10: 0

## 2022-01-25 ASSESSMENT — PAIN - FUNCTIONAL ASSESSMENT: PAIN_FUNCTIONAL_ASSESSMENT: 0-10

## 2022-01-25 NOTE — ANESTHESIA PRE PROCEDURE
[facility] Department of Anesthesiology  Pre-Anesthesia Evaluation/Consultation       Name:  Kel Lozano  : 1945  Age:  77 y.o. MRN:  [id number]  Date: 2022           Surgeon: Surgeon(s):  Radha Bowen MD    Procedure: Procedure(s):  BILATERAL ENDOVENOUS RADIOFREQUENCY ABLATION OF GREATER SAPHENOUS VEIN, BILATERAL ENDOVENOUS RADIOFREQUENCY ABLATION OF LESSER SAPHENOUS VEIN, LEFT ENDOVENOUS RADIOFREQUENCY ABLATION OF ANTERIOR ACCESSORY SAPHENOUS VEIN, BILATERAL STAB PHLEBECTOMIES     No Known Allergies  Patient Active Problem List   Diagnosis   (none) - all problems resolved or deleted     Past Medical History:   Diagnosis Date    Arthritis     Varicose vein of leg     Wears glasses      Past Surgical History:   Procedure Laterality Date    BREAST BIOPSY      BREAST LUMPECTOMY Right     2004    ELBOW SURGERY Left     plate--fracture    PARATHYROID GLAND SURGERY       Social History     Tobacco Use    Smoking status: Never Smoker    Smokeless tobacco: Never Used   Vaping Use    Vaping Use: Never used   Substance Use Topics    Alcohol use: Never    Drug use: Never     Medications  No current facility-administered medications on file prior to encounter.      Current Outpatient Medications on File Prior to Encounter   Medication Sig Dispense Refill    Multiple Vitamin (MULTIVITAMIN) tablet Take 1 tablet by mouth daily      BIOTIN PO Take 1 tablet by mouth daily      Nutritional Supplements (MENOPAUSE FORMULA PO) Take 1 tablet by mouth daily      b complex vitamins capsule Take 2 capsules by mouth daily       Multiple Vitamins-Minerals (EYE VITAMINS PO) Take 1 tablet by mouth daily       Current Facility-Administered Medications   Medication Dose Route Frequency Provider Last Rate Last Admin    0.9 % sodium chloride infusion   IntraVENous Continuous Jason Sr MD 75 mL/hr at 2234 New Bag at 2234    sodium chloride flush 0.9 % injection 5-40 mL  5-40 mL IntraVENous 2 times per day Bruno Santana MD        sodium chloride flush 0.9 % injection 5-40 mL  5-40 mL IntraVENous PRN Bruno Santana MD        0.9 % sodium chloride infusion  25 mL IntraVENous PRN Bruno Santana MD         Vital Signs (Current)   Vitals:    22 1437 22   BP:  (!) 144/64   Pulse:  83   Resp:  16   Temp:  97 °F (36.1 °C)   TempSrc:  Temporal   SpO2:  99%   Weight: 150 lb (68 kg) 145 lb 4.5 oz (65.9 kg)   Height: 5' 6\" (1.676 m) 5' 6\" (1.676 m)                                            Vital Signs Statistics (for past 48 hrs)     Temp  Av °F (36.1 °C)  Min: 97 °F (36.1 °C)   Min taken time: 22  Max: 97 °F (36.1 °C)   Max taken time: 22  Pulse  Av  Min: 80   Min taken time: 22  Max: 80   Max taken time: 22  Resp  Av  Min: 12   Min taken time: 22  Max: 12   Max taken time: 22  BP  Min: 144/64   Min taken time: 22  Max: 144/64   Max taken time: 22  SpO2  Av %  Min: 99 %   Min taken time: 22  Max: 99 %   Max taken time: 22  BP Readings from Last 3 Encounters:   22 (!) 144/64   22 102/68   21 130/64       BMI  Body mass index is 23.45 kg/m². Estimated body mass index is 23.45 kg/m² as calculated from the following:    Height as of this encounter: 5' 6\" (1.676 m). Weight as of this encounter: 145 lb 4.5 oz (65.9 kg).     CBC   Lab Results   Component Value Date    WBC 7.1 2022    RBC 4.45 2022    HGB 13.5 2022    HCT 40.3 2022    MCV 90.5 2022    RDW 13.5 2022     2022     CMP    Lab Results   Component Value Date     2022    K 4.3 2022     2022    CO2 24 2022    BUN 11 2022    CREATININE 0.5 2022    GFRAA >60 2022    AGRATIO 1.9 10/25/2021    LABGLOM >60 01/19/2022    GLUCOSE 84 01/19/2022    PROT 6.7 10/25/2021    CALCIUM 10.1 01/19/2022    BILITOT 0.3 10/25/2021    ALKPHOS 84 10/25/2021    AST 14 10/25/2021    ALT 17 10/25/2021     BMP    Lab Results   Component Value Date     01/19/2022    K 4.3 01/19/2022     01/19/2022    CO2 24 01/19/2022    BUN 11 01/19/2022    CREATININE 0.5 01/19/2022    CALCIUM 10.1 01/19/2022    GFRAA >60 01/19/2022    LABGLOM >60 01/19/2022    GLUCOSE 84 01/19/2022     POCGlucose  No results for input(s): GLUCOSE in the last 72 hours. Coags    Lab Results   Component Value Date    PROTIME 10.9 01/19/2022    INR 0.97 01/19/2022    APTT 36.9 25/75/1730     HCG (If Applicable) No results found for: PREGTESTUR, PREGSERUM, HCG, HCGQUANT   ABGs No results found for: PHART, PO2ART, XES8FGD, OPW7XXK, BEART, T7NHYMYD   Type & Screen (If Applicable)  No results found for: LABABO, LABRH                         BMI: Wt Readings from Last 3 Encounters:       NPO Status:   Date of last liquid consumption: 01/24/22   Time of last liquid consumption: 2200   Date of last solid food consumption: 01/24/22      Time of last solid consumption: 1930       Anesthesia Evaluation  Patient summary reviewed no history of anesthetic complications:   Airway: Mallampati: II  TM distance: >3 FB   Neck ROM: full  Mouth opening: > = 3 FB Dental: normal exam         Pulmonary: breath sounds clear to auscultation      (-) COPD and asthma                           Cardiovascular:  Exercise tolerance: good (>4 METS),       (-) hypertension, past MI, CABG/stent,  angina and no hyperlipidemia        Rate: normal                    Neuro/Psych:      (-) seizures, TIA and CVA           GI/Hepatic/Renal:        (-) GERD, hepatitis, liver disease and no renal disease       Endo/Other:        (-) diabetes mellitus, hypothyroidism               Abdominal:             Vascular:     - DVT and PE.       Other Findings:             Anesthesia Plan      general     ASA 2 Induction: intravenous. MIPS: Postoperative opioids intended and Prophylactic antiemetics administered. Anesthetic plan and risks discussed with patient. Plan discussed with CRNA. This pre-anesthesia assessment may be used as a history and physical.    DOS STAFF ADDENDUM:    Pt seen and examined, chart reviewed (including anesthesia, drug and allergy history). No interval changes to history and physical examination. Anesthetic plan, risks, benefits, alternatives, and personnel involved discussed with patient. Patient verbalized an understanding and agrees to proceed.       Mirella Ornelas MD  January 25, 2022  11:39 AM

## 2022-01-25 NOTE — ANESTHESIA POSTPROCEDURE EVALUATION
Department of Anesthesiology  Postprocedure Note    Patient: Marcy Garcia  MRN: [id number]  YOB: 1945  Date of evaluation: 1/25/2022  Time:  5:07 PM     Procedure Summary     Date: 01/25/22 Room / Location: [address] [place] [address]    Anesthesia Start: 1207 Anesthesia Stop: 1629    Procedure: BILATERAL ENDOVENOUS [address], BILATERAL ENDOVENOUS RADIOFREQUENCY ABLATION OF LESSER SAPHENOUS VEIN, LEFT ENDOVENOUS RADIOFREQUENCY ABLATION OF ANTERIOR ACCESSORY SAPHENOUS VEIN, BILATERAL STAB PHLEBECTOMIES (Bilateral ) Diagnosis:       Symptomatic varicose veins, bilateral      (SYMPTOMATIC VARICOSE VEINS OF BOTH LOWER EXTREMITIES)    Surgeons: Jude Way MD Responsible Provider: Lux Melendez MD    Anesthesia Type: general ASA Status: 2          Anesthesia Type: general    Soto Phase I: Soto Score: 10    Soto Phase II: Soto Score: 10    Last vitals: Reviewed and per EMR flowsheets.        Anesthesia Post Evaluation    Level of consciousness: awake and alert  Airway patency: patent  Nausea & Vomiting: no nausea and no vomiting  Complications: no  Cardiovascular status: hemodynamically stable  Respiratory status: acceptable  Hydration status: stable

## 2022-01-25 NOTE — PROGRESS NOTES
Pt to phase 2. A&O x4. VSS. Denies pain and nausea. BLE dressings D&I. Normal pedal pulses, brisk cap refill, normal sensation. IV site WDL.

## 2022-01-25 NOTE — PROGRESS NOTES
1625 pt arrived from OR, vitals stable on 2L NC, pt sedate. Bilateral legs wrapped in ACE wrap- clean, dry, and intact. Bilateral pedal pulses palpated, good cap refill.

## 2022-01-25 NOTE — BRIEF OP NOTE
Brief Postoperative Note      Patient: Marcy Garcia  YOB: 1945  MRN: [id number]    Date of Procedure: 1/25/2022    Pre-Op Diagnosis: SYMPTOMATIC VARICOSE VEINS OF BOTH LOWER EXTREMITIES    Post-Op Diagnosis: Same       Procedure(s):  BILATERAL ENDOVENOUS RADIOFREQUENCY ABLATION OF GREATER SAPHENOUS VEIN, BILATERAL ENDOVENOUS RADIOFREQUENCY ABLATION OF LESSER SAPHENOUS VEIN, LEFT ENDOVENOUS RADIOFREQUENCY ABLATION OF ANTERIOR ACCESSORY SAPHENOUS VEIN, BILATERAL STAB PHLEBECTOMIES    Surgeon(s):  Jennifer Montoya MD    Assistant:  Surgical Assistant: Rodrigo Serrato;  Omaira Aleman RN    Anesthesia: General    Estimated Blood Loss (mL): less than 50     Complications: None    Specimens:   * No specimens in log *    Implants:  * No implants in log *      Drains: * No LDAs found *    Findings: as above    Electronically signed by Jennifer Montoya MD on 1/25/2022 at 4:35 PM

## 2022-01-25 NOTE — H&P
Update History & Physical    The patient's History and Physical of January 19, 2021 was reviewed with the patient and I examined the patient. There was no change. The surgical site was confirmed by the patient and me. Plan: The risks, benefits, expected outcome, and alternative to the recommended procedure have been discussed with the patient. Patient understands and wants to proceed with the procedure.      Electronically signed by Liza Lugo MD on 1/25/2022 at 11:50 AM

## 2022-01-25 NOTE — PROGRESS NOTES
Pt alert and oriented, vitals stable on room air. Pt denies pain. Full movement in BLE. Denies numbness/tingling.

## 2022-01-25 NOTE — PROGRESS NOTES
Written and verbal discharge instructions given to pt and friend Deng Weinberg, verbalized understanding.

## 2022-01-26 NOTE — OP NOTE
prone position. Her legs were then  circumferentially prepped and draped in a sterile fashion. Ultrasound  was sterilely passed on the field and the patient was placed in reverse  Trendelenburg. The right lesser saphenous vein was then identified a handsbreadth above  the ankle. Using ultrasound guidance and the micropuncture technique,  the vein was entered and upsized to a standard 7-L[place]anese sheath. A  ClosureFast catheter was then inserted and advanced up to the  saphenopopliteal junction where it was withdrawn inferiorly to  approximately the origin of Giacomini vein. It was anchored in place  and marked on the skin. Varicosities had been marked on both legs in the preoperative holding  area while the patient was standing with indelible ink and those that  could be reached on the right posterior calf while in prone position  were removed using a stab phlebectomy technique with Oesch hooks. Tumescent fluid was then injected along the course of the right lesser  saphenous vein using ultrasound guidance and with the patient in  Trendelenburg, the vein was ablated using a standard pullback technique  heating the vein to 120 degrees centigrade throughout its course. The  catheter and sheath were removed. Attention was then directed to left lesser saphenous vein. With the  patient back in reverse Trendelenburg, the vein was identified  approximately a handsbreadth above the ankle. Using ultrasound guidance  and a micropuncture technique, the left lesser saphenous vein was  entered without difficulty and upsized to a standard 7-Lebanese sheath. Through the sheath was passed a ClosureFast catheter up to the  saphenopopliteal junction where it was withdrawn under ultrasound  guidance just below the origin of the Giacomini vein. It was anchored  in place and marked on the skin.     Varicosities on the left calf that could be reached while in the prone  position were removed using the stab phlebectomy technique with Oesch  hooks. Tumescent fluid was then injected along the course of the left  lesser saphenous vein using ultrasound guidance and with the patient in  Trendelenburg, the vein was ablated using a standard pullback technique  heating the vein to 120 degrees centigrade throughout its course. The  catheter and sheath were removed. Impervious stockinettes were then  applied to both legs and held in place with Cobans. Drapes were taken  down. The patient was rotated into supine position. The bilateral groins and legs were then re-prepped and draped in a  sterile fashion. Attention was directed to the left leg. Ultrasound  was used to identify the left greater saphenous vein just above the  ankle and with the patient in reverse Trendelenburg, the vein was  entered using micropuncture technique, which was upsized to 7-[place]  sheath. Through the sheath was passed ClosureFast catheter up to the  saphenofemoral junction. Under ultrasound guidance it was drawn to just  distal to the origin of the superficial epigastric vein and it was  locked in place. The large left anterior saphenous vein was identified  using ultrasound and tracked approximately two-thirds of the way down  the thigh. Using ultrasound guidance, it too was entered with  micropuncture technique and upsized to a standard 7-[place] Islander sheath. Phlebectomies were then performed on the left leg removing all remaining  marked veins using stab phlebectomy technique and Oesch hooks. The  patient was placed in Trendelenburg and tumescent fluid was injected  along the course of the left greater saphenous vein using ultrasound  guidance. The vein was then ablated using a standard pullback technique  heating the vein to 120 degrees centigrade throughout its course. The  catheter and sheath were then removed. The catheter was then inserted into the sheath, which had been placed to  the anterior accessory saphenous vein.   It was advanced proximally and  its location was confirmed using ultrasound guidance. Tumescent fluid  was injected around this vein using ultrasound guidance with the patient  in Trendelenburg, the vein was ablated using a standard pullback  technique heating the vein to 120 degrees centigrade throughout its  course. The catheter and sheath were then removed. Attention was then direct to the right leg. The patient was placed in  reverse Trendelenburg and ultrasound was used to identify the right  greater saphenous vein just above the ankle. Using a micropuncture  technique with ultrasound guidance, the vein was entered without  difficulty and upsized to a 7-Irish sheath. Through the sheath was  passed a ClosureFast catheter upto the saphenofemoral junction where it  was withdrawn under ultrasound guidance to just below the origin of the  superficial epigastric vein. It was locked in place on the skin. Remaining varicosities of the right leg, which had been marked  preoperatively in the holding area were then removed using a stab  phlebectomy technique with Oesch hooks. A total of 51 stab  phlebectomies were performed in total between the two legs. Tumescent  fluid was then injected along the course of the right greater saphenous  vein using ultrasound guidance. The patient was placed in Trendelenburg  and the vein was ablated using a standard pullback technique heating the  vein to 120 degrees centigrade throughout its length. The catheter and  sheath were then removed. Steri-Strips were applied to all puncture sites bilaterally in the legs  and clean, sterile, dry compressive dressings were placed. The patient  was then extubated and transferred to the recovery room in stable  condition having tolerated the procedure well.         Radhika Thompson MD    D: 01/25/2022 16:56:29       T: 01/25/2022 22:16:10     [id number]  Job#: [id number]     Doc#: [id number]    CC:

## 2022-01-27 DIAGNOSIS — I83.893 SYMPTOMATIC VARICOSE VEINS OF BOTH LOWER EXTREMITIES: Primary | ICD-10-CM

## 2022-01-28 ENCOUNTER — PROCEDURE VISIT (OUTPATIENT)
Dept: VASCULAR SURGERY | Age: 77
End: 2022-01-28

## 2022-01-28 ENCOUNTER — OFFICE VISIT (OUTPATIENT)
Dept: VASCULAR SURGERY | Age: 77
End: 2022-01-28

## 2022-01-28 VITALS
DIASTOLIC BLOOD PRESSURE: 72 MMHG | BODY MASS INDEX: 23.3 KG/M2 | WEIGHT: 145 LBS | SYSTOLIC BLOOD PRESSURE: 118 MMHG | HEIGHT: 66 IN

## 2022-01-28 DIAGNOSIS — I83.893 SYMPTOMATIC VARICOSE VEINS OF BOTH LOWER EXTREMITIES: Primary | ICD-10-CM

## 2022-01-28 DIAGNOSIS — I83.893 SYMPTOMATIC VARICOSE VEINS OF BOTH LOWER EXTREMITIES: ICD-10-CM

## 2022-01-28 PROCEDURE — 99024 POSTOP FOLLOW-UP VISIT: CPT | Performed by: SURGERY

## 2022-01-28 NOTE — PROGRESS NOTES
[facility]         Post-op Check/Notes  Date: [unfilled]   Name: Myles Whitaker  [x]  RE-WRAP [] 2 WK POST-OP []OTHER      SURGEON    DAYS POST-OP 3   SURG. DATE     ANESTHESIA: []  GENERAL [] EPIDURAL  HISTORY:   [x]  PATIENT IS AMBULATORY  [x]  PATIENT HAS NO COMPLAINTS AND INDICATES THAT HE/SHE IS DOING FINE  []  PATIENT EXPRESSED SOME DIFFICULTIES WITH:   [] PAIN MEDICATION:              []  THE MEDICATION WAS INTOLERABLE        []  THE MEDICATION WAS NOT EFFECTIVE        [] THE PATIENT WAS GIVEN A NEW RX FOR:              [] THE PATIENT DECIDED TO TOLERATE PAIN WITHOUT MEDICATION    [] POST OP NAUSEA        []  THE PATIENT WAS GIVEN RX FOR:                  []  THE PATIENT WAS ADVISED:                  []  OTHER:               ON 2 -4 WEEK POST-OP CHECK  []  PRE-OP LEG PAIN IMPROVED  []  PRE-OP LEG PAIN UNCHANGED    PHYSICAL EXAMINATION  [x]  INCISIONS ARE APPROXIMATED WITHOUT SIGNS OF INFECTION  []  INFECTION NOTED DURING EXAM    ECCHYMOSIS   SWELLING        LOCATION:       []  MINIMAL    []  MINIMAL        []  ANTIBIOTICS GIVEN:      [x]  MODERATE   [x]  MODERATE  []  RESIDUAL VARICOSITIES     []  SIGNIFICANT   []  SIGNIFICANT       LOCATION:       []  RESOLVED   []  RESOLVED  [x]  PARATHESIAS/COMMENTS: None noted             COMMENTS/NOTES:  Duplex with good ablations - no deep extension                   FOLLOW-UP:  [x]  POST-OP INSTRUCTIONS REVIEWED WITH THE PATIENT AND QUESTIONS ANSWERRED  [x]  ROUTINE WITH OPERATING PHYSICIAN 2 weeks  [] PRN FOR SCLEROTHERAPY  []  PRN FOR Kolleen Abad /VEIN [facility]      [] Caroleen Bamberger  []  PRN  []  OTHER:                  XXX I recommended wearing 15/20 mmHg hose for 48 hours more then daily for 4-6 weeks during daily activity. May resume all activities progressively as tolerated. Answered all questions.     Terrance Zuniga MD

## 2022-02-08 ENCOUNTER — TELEPHONE (OUTPATIENT)
Dept: VASCULAR SURGERY | Age: 77
End: 2022-02-08

## 2022-02-08 RX ORDER — CIPROFLOXACIN 500 MG/1
500 TABLET, FILM COATED ORAL 2 TIMES DAILY
Qty: 20 TABLET | Refills: 0 | Status: SHIPPED | OUTPATIENT
Start: 2022-02-08 | End: 2022-02-18

## 2022-02-08 NOTE — TELEPHONE ENCOUNTER
Spoke to patient who has an area of concern on her leg. States one of the incision is red, warm to touch and painful. It is getting worse. Per Dr. Sony Fulton:   Cipro 500 po bid sent to [address] [place] [address] on [address]. Warm Compresses TID to the area. Patient will call with any other questions or concerns.

## 2022-02-10 ENCOUNTER — OFFICE VISIT (OUTPATIENT)
Dept: VASCULAR SURGERY | Age: 77
End: 2022-02-10

## 2022-02-10 VITALS
DIASTOLIC BLOOD PRESSURE: 68 MMHG | WEIGHT: 145 LBS | HEIGHT: 66 IN | SYSTOLIC BLOOD PRESSURE: 115 MMHG | BODY MASS INDEX: 23.3 KG/M2

## 2022-02-10 DIAGNOSIS — I83.893 SYMPTOMATIC VARICOSE VEINS OF BOTH LOWER EXTREMITIES: Primary | ICD-10-CM

## 2022-02-10 PROCEDURE — 99024 POSTOP FOLLOW-UP VISIT: CPT | Performed by: SURGERY

## 2022-02-10 NOTE — PROGRESS NOTES
[facility]         Post-op Check/Notes  Date: [unfilled]   Name: Hernando Armendariz  []  RE-WRAP [x] 2 WK POST-OP []OTHER      SURGEON    DAYS POST-OP    SURG. DATE     ANESTHESIA: []  GENERAL [] EPIDURAL  HISTORY:   [x]  PATIENT IS AMBULATORY  []  PATIENT HAS NO COMPLAINTS AND INDICATES THAT HE/SHE IS DOING FINE  []  PATIENT EXPRESSED SOME DIFFICULTIES WITH:   [] PAIN MEDICATION:              []  THE MEDICATION WAS INTOLERABLE        []  THE MEDICATION WAS NOT EFFECTIVE        [] THE PATIENT WAS GIVEN A NEW RX FOR:              [] THE PATIENT DECIDED TO TOLERATE PAIN WITHOUT MEDICATION    [] POST OP NAUSEA        []  THE PATIENT WAS GIVEN RX FOR:                  []  THE PATIENT WAS ADVISED:                  []  OTHER:               ON 2 WEEK POST-OP CHECK  [x]  PRE-OP LEG PAIN IMPROVED  []  PRE-OP LEG PAIN UNCHANGED    PHYSICAL EXAMINATION  []  INCISIONS ARE APPROXIMATED WITHOUT SIGNS OF INFECTION  [x]  INFECTION NOTED DURING EXAM    ECCHYMOSIS   SWELLING        LOCATION:medial L thigh - improved with cipro   [x]  MINIMAL    [x]  MINIMAL        [x]  ANTIBIOTICS GIVEN: cipro 500 BID x 10 days  []  MODERATE   []  MODERATE  []  RESIDUAL VARICOSITIES     []  SIGNIFICANT   []  SIGNIFICANT       LOCATION:       []  RESOLVED   []  RESOLVED  [x]  PARATHESIAS/COMMENTS: Mild - around ankle           COMMENTS/NOTES:                     FOLLOW-UP:  [x]  POST-OP INSTRUCTIONS REVIEWED WITH THE PATIENT AND QUESTIONS ANSWERRED  [x]  ROUTINE WITH OPERATING PHYSICIAN 2 weeks  [] PRN FOR SCLEROTHERAPY  []  PRN FOR Ara Susan /VEIN [facility]      [] Nick Barreto  []  PRN  []  OTHER:                  XXX I recommended continued wearing 15/20 mmHg hose daily for 4-6 weeks during daily activity. May resume all activities progressively as tolerated. Warm compresses to areas of clot BID. Complete 10 days of Cipro. Answered all questions.     Angele Leventhal, MD

## 2022-02-25 ENCOUNTER — OFFICE VISIT (OUTPATIENT)
Dept: VASCULAR SURGERY | Age: 77
End: 2022-02-25

## 2022-02-25 VITALS
WEIGHT: 140 LBS | DIASTOLIC BLOOD PRESSURE: 68 MMHG | BODY MASS INDEX: 22.5 KG/M2 | SYSTOLIC BLOOD PRESSURE: 115 MMHG | HEIGHT: 66 IN

## 2022-02-25 DIAGNOSIS — I83.893 SYMPTOMATIC VARICOSE VEINS OF BOTH LOWER EXTREMITIES: Primary | ICD-10-CM

## 2022-02-25 PROCEDURE — 99024 POSTOP FOLLOW-UP VISIT: CPT | Performed by: SURGERY

## 2022-02-25 NOTE — PROGRESS NOTES
[facility]         Post-op Check/Notes  Date: [unfilled]   Name: Shannon Brush  []  RE-WRAP [x] 4 WK POST-OP []OTHER      SURGEON    DAYS POST-OP    SURG. DATE     ANESTHESIA: []  GENERAL [] EPIDURAL  HISTORY:   [x]  PATIENT IS AMBULATORY  [x]  PATIENT HAS NO COMPLAINTS AND INDICATES THAT HE/SHE IS DOING FINE  []  PATIENT EXPRESSED SOME DIFFICULTIES WITH:   [] PAIN MEDICATION:              []  THE MEDICATION WAS INTOLERABLE        []  THE MEDICATION WAS NOT EFFECTIVE        [] THE PATIENT WAS GIVEN A NEW RX FOR:            [] THE PATIENT DECIDED TO TOLERATE PAIN WITHOUT MEDICATION    [] POST OP NAUSEA        []  THE PATIENT WAS GIVEN RX FOR:                  []  THE PATIENT WAS ADVISED:                  []  OTHER:               ON 4 WEEK POST-OP CHECK  [x]  PRE-OP LEG PAIN IMPROVED  []  PRE-OP LEG PAIN UNCHANGED    PHYSICAL EXAMINATION  []  INCISIONS ARE APPROXIMATED WITHOUT SIGNS OF INFECTION  [x]  INFECTION NOTED DURING EXAM    ECCHYMOSIS   SWELLING        LOCATION:medial L thigh - resolved    []  MINIMAL    []  MINIMAL        []  ANTIBIOTICS GIVEN:      []  MODERATE   []  MODERATE  []  RESIDUAL VARICOSITIES     []  SIGNIFICANT   []  SIGNIFICANT       LOCATION:       [x]  RESOLVED   [x]  RESOLVED  [x]  PARATHESIAS/COMMENTS: Mild - around ankle IMPROVED          COMMENTS/NOTES:  Multi[ple hard knots throughout - left thigh worse. Stab sites with some dimpling           FOLLOW-UP:  [x]  POST-OP INSTRUCTIONS REVIEWED WITH THE PATIENT AND QUESTIONS ANSWERRED  [x]  ROUTINE WITH OPERATING PHYSICIAN 4-8 weeks  [] PRN FOR SCLEROTHERAPY  []  PRN FOR [place] Dorothy /VEIN [facility]      [] Starr Valenzuela  []  PRN  []  OTHER:                  XXX I recommended continued wearing 15/20 mmHg hose daily for 4-6 weeks during daily activity. May resume all activities as tolerated. Warm compresses to areas of clot BID. Asked to return for final visit but moving to [place] 4 hours from here - will try to return.  May need fading cream for discoloration in the future. Answered all questions.     Violet Hansen MD

## (undated) DEVICE — SOLUTION IV IRRIG POUR BRL 0.9% SODIUM CHL 2F7124

## (undated) DEVICE — APPLICATOR PREP 26ML 0.7% IOD POVACRYLEX 74% ISO ALC ST

## (undated) DEVICE — KIT MICROINTRODUCER 4FR ECHOGENIC NDL L7CM 21GA STIFF COAX

## (undated) DEVICE — LOTION PREP REMV 4OZ IODO CLR TINC OF BENZ DURAPREP

## (undated) DEVICE — INTRODUCER CATH MIC L45CM DIA4FR TIP L7CM B BVL S STL WIRE

## (undated) DEVICE — SET ADMIN NEEDLESS Y SITE RLER CLMP M SPIN LOK 10 GTT LEN

## (undated) DEVICE — KNIFE OPHTH W2.5MM 55DEG CRESC BVL UP ANG XSTAR

## (undated) DEVICE — STRIP,CLOSURE,WOUND,MEDI-STRIP,1/2X4: Brand: MEDLINE

## (undated) DEVICE — MAJOR SET UP: Brand: MEDLINE INDUSTRIES, INC.

## (undated) DEVICE — SUTURE PERMAHAND SZ 4-0 L18IN NONABSORBABLE BLK SILK BRAID A183H

## (undated) DEVICE — Z DISCONTINUED USE 2272117 DRAPE SURG 3 QTR N INVASIVE 2 LAYR DISP

## (undated) DEVICE — GAUZE FLUFF 2 PLY: Brand: DEROYAL

## (undated) DEVICE — KNIFE OPHTH 25MM 55DEG BVL UP ANG SFTY CATRCT XSTAR

## (undated) DEVICE — PROVE COVER: Brand: UNBRANDED

## (undated) DEVICE — SHEATH INTRO 7 FRX7 CM 19 GAX45 CM SET CATH J TIP 2 END VNUS

## (undated) DEVICE — GUIDEWIRE VASC L150CM DIA0.025IN PTFE DBL END FIX COR

## (undated) DEVICE — CATHETER ABLAT 7FR L100CM 0.025IN ENDOVENOUS RF CLOSUREFAST

## (undated) DEVICE — BANDAGE COMPR W6INXL10YD ST M E WHITE/BEIGE

## (undated) DEVICE — SET INTRO SHTH MIC L7CM DIA7FR 0.018IN NDL L2.75IN DIA21GA

## (undated) DEVICE — BANDAGE,GAUZE,BULKEE II,4.5"X4.1YD,STRL: Brand: MEDLINE